# Patient Record
Sex: MALE | Race: WHITE | NOT HISPANIC OR LATINO | URBAN - METROPOLITAN AREA
[De-identification: names, ages, dates, MRNs, and addresses within clinical notes are randomized per-mention and may not be internally consistent; named-entity substitution may affect disease eponyms.]

---

## 2023-09-17 ENCOUNTER — HOSPITAL ENCOUNTER (INPATIENT)
Facility: HOSPITAL | Age: 53
LOS: 3 days | Discharge: HOME | DRG: 698 | End: 2023-09-20
Attending: EMERGENCY MEDICINE | Admitting: STUDENT IN AN ORGANIZED HEALTH CARE EDUCATION/TRAINING PROGRAM
Payer: OTHER GOVERNMENT

## 2023-09-17 DIAGNOSIS — R78.81 BACTEREMIA DUE TO STAPHYLOCOCCUS: ICD-10-CM

## 2023-09-17 DIAGNOSIS — B95.8 BACTEREMIA DUE TO STAPHYLOCOCCUS: ICD-10-CM

## 2023-09-17 DIAGNOSIS — R07.9 CHEST PAIN: ICD-10-CM

## 2023-09-17 DIAGNOSIS — A41.9 SEPSIS: ICD-10-CM

## 2023-09-17 DIAGNOSIS — R06.02 SOB (SHORTNESS OF BREATH): ICD-10-CM

## 2023-09-17 LAB
ALBUMIN SERPL BCP-MCNC: 2.9 G/DL (ref 3.5–5.2)
ALLENS TEST: ABNORMAL
ALP SERPL-CCNC: 122 U/L (ref 55–135)
ALT SERPL W/O P-5'-P-CCNC: 121 U/L (ref 10–44)
ANION GAP SERPL CALC-SCNC: 12 MMOL/L (ref 8–16)
AST SERPL-CCNC: 250 U/L (ref 10–40)
BACTERIA #/AREA URNS AUTO: ABNORMAL /HPF
BASOPHILS # BLD AUTO: 0.04 K/UL (ref 0–0.2)
BASOPHILS NFR BLD: 0.8 % (ref 0–1.9)
BILIRUB SERPL-MCNC: 1.6 MG/DL (ref 0.1–1)
BILIRUB UR QL STRIP: NEGATIVE
BUN SERPL-MCNC: 19 MG/DL (ref 6–20)
CALCIUM SERPL-MCNC: 7.7 MG/DL (ref 8.7–10.5)
CHLORIDE SERPL-SCNC: 113 MMOL/L (ref 95–110)
CLARITY UR REFRACT.AUTO: CLEAR
CO2 SERPL-SCNC: 13 MMOL/L (ref 23–29)
COLOR UR AUTO: YELLOW
CREAT SERPL-MCNC: 1.7 MG/DL (ref 0.5–1.4)
DIFFERENTIAL METHOD: ABNORMAL
EOSINOPHIL # BLD AUTO: 0 K/UL (ref 0–0.5)
EOSINOPHIL NFR BLD: 0.8 % (ref 0–8)
ERYTHROCYTE [DISTWIDTH] IN BLOOD BY AUTOMATED COUNT: 12.4 % (ref 11.5–14.5)
EST. GFR  (NO RACE VARIABLE): 47.6 ML/MIN/1.73 M^2
GLUCOSE SERPL-MCNC: 115 MG/DL (ref 70–110)
GLUCOSE UR QL STRIP: NEGATIVE
HCO3 UR-SCNC: 14.4 MMOL/L (ref 24–28)
HCT VFR BLD AUTO: 45.2 % (ref 40–54)
HGB BLD-MCNC: 15.5 G/DL (ref 14–18)
HGB UR QL STRIP: ABNORMAL
IMM GRANULOCYTES # BLD AUTO: 0.08 K/UL (ref 0–0.04)
IMM GRANULOCYTES NFR BLD AUTO: 1.6 % (ref 0–0.5)
KETONES UR QL STRIP: NEGATIVE
LDH SERPL L TO P-CCNC: 2.42 MMOL/L (ref 0.5–2.2)
LDH SERPL L TO P-CCNC: 3.68 MMOL/L (ref 0.5–2.2)
LEUKOCYTE ESTERASE UR QL STRIP: ABNORMAL
LIPASE SERPL-CCNC: 16 U/L (ref 4–60)
LYMPHOCYTES # BLD AUTO: 0.2 K/UL (ref 1–4.8)
LYMPHOCYTES NFR BLD: 4.1 % (ref 18–48)
MAGNESIUM SERPL-MCNC: 1.3 MG/DL (ref 1.6–2.6)
MCH RBC QN AUTO: 31.8 PG (ref 27–31)
MCHC RBC AUTO-ENTMCNC: 34.3 G/DL (ref 32–36)
MCV RBC AUTO: 93 FL (ref 82–98)
MICROSCOPIC COMMENT: ABNORMAL
MONOCYTES # BLD AUTO: 0 K/UL (ref 0.3–1)
MONOCYTES NFR BLD: 0.8 % (ref 4–15)
NEUTROPHILS # BLD AUTO: 4.7 K/UL (ref 1.8–7.7)
NEUTROPHILS NFR BLD: 91.9 % (ref 38–73)
NITRITE UR QL STRIP: NEGATIVE
NRBC BLD-RTO: 0 /100 WBC
PCO2 BLDA: 23.2 MMHG (ref 35–45)
PH SMN: 7.4 [PH] (ref 7.35–7.45)
PH UR STRIP: 6 [PH] (ref 5–8)
PLATELET # BLD AUTO: 130 K/UL (ref 150–450)
PLATELET BLD QL SMEAR: ABNORMAL
PMV BLD AUTO: 10.2 FL (ref 9.2–12.9)
PO2 BLDA: 60 MMHG (ref 40–60)
POC BE: -10 MMOL/L
POC SATURATED O2: 91 % (ref 95–100)
POC TCO2: 15 MMOL/L (ref 24–29)
POTASSIUM SERPL-SCNC: 3.2 MMOL/L (ref 3.5–5.1)
PROT SERPL-MCNC: 5.1 G/DL (ref 6–8.4)
PROT UR QL STRIP: NEGATIVE
RBC # BLD AUTO: 4.88 M/UL (ref 4.6–6.2)
RBC #/AREA URNS AUTO: 79 /HPF (ref 0–4)
SAMPLE: ABNORMAL
SARS-COV-2 RDRP RESP QL NAA+PROBE: NEGATIVE
SITE: ABNORMAL
SODIUM SERPL-SCNC: 138 MMOL/L (ref 136–145)
SP GR UR STRIP: 1.01 (ref 1–1.03)
SQUAMOUS #/AREA URNS AUTO: 2 /HPF
TROPONIN I SERPL DL<=0.01 NG/ML-MCNC: 0.02 NG/ML (ref 0–0.03)
URN SPEC COLLECT METH UR: ABNORMAL
WBC # BLD AUTO: 5.13 K/UL (ref 3.9–12.7)
WBC #/AREA URNS AUTO: 17 /HPF (ref 0–5)

## 2023-09-17 PROCEDURE — 96361 HYDRATE IV INFUSION ADD-ON: CPT

## 2023-09-17 PROCEDURE — 93010 EKG 12-LEAD: ICD-10-PCS | Mod: ,,, | Performed by: INTERNAL MEDICINE

## 2023-09-17 PROCEDURE — 84484 ASSAY OF TROPONIN QUANT: CPT

## 2023-09-17 PROCEDURE — 96365 THER/PROPH/DIAG IV INF INIT: CPT

## 2023-09-17 PROCEDURE — 87086 URINE CULTURE/COLONY COUNT: CPT

## 2023-09-17 PROCEDURE — 96366 THER/PROPH/DIAG IV INF ADDON: CPT

## 2023-09-17 PROCEDURE — 87077 CULTURE AEROBIC IDENTIFY: CPT

## 2023-09-17 PROCEDURE — 93010 ELECTROCARDIOGRAM REPORT: CPT | Mod: ,,, | Performed by: INTERNAL MEDICINE

## 2023-09-17 PROCEDURE — 85025 COMPLETE CBC W/AUTO DIFF WBC: CPT

## 2023-09-17 PROCEDURE — 81001 URINALYSIS AUTO W/SCOPE: CPT

## 2023-09-17 PROCEDURE — 87088 URINE BACTERIA CULTURE: CPT

## 2023-09-17 PROCEDURE — 99291 CRITICAL CARE FIRST HOUR: CPT

## 2023-09-17 PROCEDURE — 87154 CUL TYP ID BLD PTHGN 6+ TRGT: CPT

## 2023-09-17 PROCEDURE — 83735 ASSAY OF MAGNESIUM: CPT

## 2023-09-17 PROCEDURE — 83605 ASSAY OF LACTIC ACID: CPT

## 2023-09-17 PROCEDURE — 12000002 HC ACUTE/MED SURGE SEMI-PRIVATE ROOM

## 2023-09-17 PROCEDURE — U0002 COVID-19 LAB TEST NON-CDC: HCPCS

## 2023-09-17 PROCEDURE — 83690 ASSAY OF LIPASE: CPT

## 2023-09-17 PROCEDURE — 87040 BLOOD CULTURE FOR BACTERIA: CPT | Mod: 59

## 2023-09-17 PROCEDURE — 87186 SC STD MICRODIL/AGAR DIL: CPT

## 2023-09-17 PROCEDURE — 96367 TX/PROPH/DG ADDL SEQ IV INF: CPT

## 2023-09-17 PROCEDURE — 96368 THER/DIAG CONCURRENT INF: CPT

## 2023-09-17 PROCEDURE — 99900035 HC TECH TIME PER 15 MIN (STAT)

## 2023-09-17 PROCEDURE — 63600175 PHARM REV CODE 636 W HCPCS

## 2023-09-17 PROCEDURE — 93005 ELECTROCARDIOGRAM TRACING: CPT

## 2023-09-17 PROCEDURE — 99285 EMERGENCY DEPT VISIT HI MDM: CPT | Mod: 25

## 2023-09-17 PROCEDURE — 80053 COMPREHEN METABOLIC PANEL: CPT

## 2023-09-17 PROCEDURE — 25000003 PHARM REV CODE 250

## 2023-09-17 PROCEDURE — 82803 BLOOD GASES ANY COMBINATION: CPT

## 2023-09-17 PROCEDURE — 25500020 PHARM REV CODE 255: Performed by: EMERGENCY MEDICINE

## 2023-09-17 RX ORDER — IPRATROPIUM BROMIDE AND ALBUTEROL SULFATE 2.5; .5 MG/3ML; MG/3ML
SOLUTION RESPIRATORY (INHALATION)
COMMUNITY
Start: 2023-07-05

## 2023-09-17 RX ORDER — ALBUTEROL SULFATE 90 UG/1
AEROSOL, METERED RESPIRATORY (INHALATION)
COMMUNITY
Start: 2023-07-05

## 2023-09-17 RX ORDER — MAGNESIUM SULFATE HEPTAHYDRATE 40 MG/ML
2 INJECTION, SOLUTION INTRAVENOUS
Status: COMPLETED | OUTPATIENT
Start: 2023-09-17 | End: 2023-09-18

## 2023-09-17 RX ORDER — POTASSIUM CHLORIDE 20 MEQ/1
40 TABLET, EXTENDED RELEASE ORAL ONCE
Status: COMPLETED | OUTPATIENT
Start: 2023-09-17 | End: 2023-09-17

## 2023-09-17 RX ORDER — FLUTICASONE PROPIONATE AND SALMETEROL 500; 50 UG/1; UG/1
1 POWDER RESPIRATORY (INHALATION) 2 TIMES DAILY
COMMUNITY
Start: 2023-03-16 | End: 2023-09-17 | Stop reason: CLARIF

## 2023-09-17 RX ORDER — FLUTICASONE PROPIONATE AND SALMETEROL 500; 50 UG/1; UG/1
1 POWDER RESPIRATORY (INHALATION) 2 TIMES DAILY
COMMUNITY

## 2023-09-17 RX ORDER — ASPIRIN 81 MG/1
81 TABLET ORAL DAILY
COMMUNITY

## 2023-09-17 RX ORDER — CHOLECALCIFEROL (VITAMIN D3) 25 MCG
1000 TABLET ORAL DAILY
COMMUNITY

## 2023-09-17 RX ADMIN — VANCOMYCIN HYDROCHLORIDE 1750 MG: 500 INJECTION, POWDER, LYOPHILIZED, FOR SOLUTION INTRAVENOUS at 11:09

## 2023-09-17 RX ADMIN — POTASSIUM CHLORIDE 40 MEQ: 1500 TABLET, EXTENDED RELEASE ORAL at 10:09

## 2023-09-17 RX ADMIN — PIPERACILLIN SODIUM AND TAZOBACTAM SODIUM 4.5 G: 4; .5 INJECTION, POWDER, FOR SOLUTION INTRAVENOUS at 09:09

## 2023-09-17 RX ADMIN — MAGNESIUM SULFATE HEPTAHYDRATE 2 G: 40 INJECTION, SOLUTION INTRAVENOUS at 10:09

## 2023-09-17 RX ADMIN — SODIUM CHLORIDE, POTASSIUM CHLORIDE, SODIUM LACTATE AND CALCIUM CHLORIDE 2000 ML: 600; 310; 30; 20 INJECTION, SOLUTION INTRAVENOUS at 09:09

## 2023-09-17 RX ADMIN — IOHEXOL 75 ML: 350 INJECTION, SOLUTION INTRAVENOUS at 09:09

## 2023-09-18 PROBLEM — R65.20 SEVERE SEPSIS: Status: ACTIVE | Noted: 2023-09-18

## 2023-09-18 PROBLEM — A41.9 SEVERE SEPSIS: Status: ACTIVE | Noted: 2023-09-18

## 2023-09-18 PROBLEM — J45.20 MILD INTERMITTENT ASTHMA: Status: ACTIVE | Noted: 2023-09-18

## 2023-09-18 PROBLEM — K21.00 GASTROESOPHAGEAL REFLUX DISEASE WITH ESOPHAGITIS WITHOUT HEMORRHAGE: Status: ACTIVE | Noted: 2023-09-18

## 2023-09-18 PROBLEM — J96.01 ACUTE HYPOXEMIC RESPIRATORY FAILURE: Status: ACTIVE | Noted: 2023-09-18

## 2023-09-18 PROBLEM — E83.42 HYPOMAGNESEMIA: Status: ACTIVE | Noted: 2023-09-18

## 2023-09-18 PROBLEM — Z87.448 H/O URETHRAL STRICTURE: Status: ACTIVE | Noted: 2023-09-18

## 2023-09-18 PROBLEM — E87.6 HYPOKALEMIA: Status: ACTIVE | Noted: 2023-09-18

## 2023-09-18 PROBLEM — N17.9 AKI (ACUTE KIDNEY INJURY): Status: ACTIVE | Noted: 2023-09-18

## 2023-09-18 LAB
ALBUMIN SERPL BCP-MCNC: 2.7 G/DL (ref 3.5–5.2)
ALP SERPL-CCNC: 85 U/L (ref 55–135)
ALT SERPL W/O P-5'-P-CCNC: 204 U/L (ref 10–44)
ANION GAP SERPL CALC-SCNC: 8 MMOL/L (ref 8–16)
AST SERPL-CCNC: 274 U/L (ref 10–40)
BASOPHILS # BLD AUTO: 0.07 K/UL (ref 0–0.2)
BASOPHILS NFR BLD: 0.5 % (ref 0–1.9)
BILIRUB SERPL-MCNC: 1.6 MG/DL (ref 0.1–1)
BUN SERPL-MCNC: 16 MG/DL (ref 6–20)
CALCIUM SERPL-MCNC: 7.5 MG/DL (ref 8.7–10.5)
CHLORIDE SERPL-SCNC: 111 MMOL/L (ref 95–110)
CO2 SERPL-SCNC: 16 MMOL/L (ref 23–29)
CREAT SERPL-MCNC: 1.7 MG/DL (ref 0.5–1.4)
DIFFERENTIAL METHOD: ABNORMAL
EOSINOPHIL # BLD AUTO: 0 K/UL (ref 0–0.5)
EOSINOPHIL NFR BLD: 0.1 % (ref 0–8)
ERYTHROCYTE [DISTWIDTH] IN BLOOD BY AUTOMATED COUNT: 12.7 % (ref 11.5–14.5)
EST. GFR  (NO RACE VARIABLE): 47.6 ML/MIN/1.73 M^2
GLUCOSE SERPL-MCNC: 166 MG/DL (ref 70–110)
HAV IGM SERPL QL IA: NORMAL
HBV CORE IGM SERPL QL IA: NORMAL
HBV SURFACE AG SERPL QL IA: NORMAL
HCT VFR BLD AUTO: 40.3 % (ref 40–54)
HCV AB SERPL QL IA: NORMAL
HCV AB SERPL QL IA: NORMAL
HGB BLD-MCNC: 13.6 G/DL (ref 14–18)
HIV 1+2 AB+HIV1 P24 AG SERPL QL IA: NORMAL
IMM GRANULOCYTES # BLD AUTO: 0.17 K/UL (ref 0–0.04)
IMM GRANULOCYTES NFR BLD AUTO: 1.2 % (ref 0–0.5)
INR PPP: 1.1 (ref 0.8–1.2)
LACTATE SERPL-SCNC: 1.7 MMOL/L (ref 0.5–2.2)
LYMPHOCYTES # BLD AUTO: 0.7 K/UL (ref 1–4.8)
LYMPHOCYTES NFR BLD: 4.9 % (ref 18–48)
MAGNESIUM SERPL-MCNC: 4.1 MG/DL (ref 1.6–2.6)
MCH RBC QN AUTO: 31.9 PG (ref 27–31)
MCHC RBC AUTO-ENTMCNC: 33.7 G/DL (ref 32–36)
MCV RBC AUTO: 94 FL (ref 82–98)
MONOCYTES # BLD AUTO: 0.8 K/UL (ref 0.3–1)
MONOCYTES NFR BLD: 5.7 % (ref 4–15)
NEUTROPHILS # BLD AUTO: 11.9 K/UL (ref 1.8–7.7)
NEUTROPHILS NFR BLD: 87.6 % (ref 38–73)
NRBC BLD-RTO: 0 /100 WBC
PHOSPHATE SERPL-MCNC: 2.2 MG/DL (ref 2.7–4.5)
PLATELET # BLD AUTO: 112 K/UL (ref 150–450)
PLATELET BLD QL SMEAR: ABNORMAL
PMV BLD AUTO: 10.8 FL (ref 9.2–12.9)
POTASSIUM SERPL-SCNC: 4.1 MMOL/L (ref 3.5–5.1)
PROCALCITONIN SERPL IA-MCNC: 15.95 NG/ML
PROT SERPL-MCNC: 4.8 G/DL (ref 6–8.4)
PROTHROMBIN TIME: 11.5 SEC (ref 9–12.5)
RBC # BLD AUTO: 4.27 M/UL (ref 4.6–6.2)
SODIUM SERPL-SCNC: 135 MMOL/L (ref 136–145)
WBC # BLD AUTO: 13.62 K/UL (ref 3.9–12.7)

## 2023-09-18 PROCEDURE — 96366 THER/PROPH/DIAG IV INF ADDON: CPT

## 2023-09-18 PROCEDURE — 80053 COMPREHEN METABOLIC PANEL: CPT | Performed by: HOSPITALIST

## 2023-09-18 PROCEDURE — 85025 COMPLETE CBC W/AUTO DIFF WBC: CPT | Performed by: HOSPITALIST

## 2023-09-18 PROCEDURE — 85610 PROTHROMBIN TIME: CPT | Performed by: HOSPITALIST

## 2023-09-18 PROCEDURE — 99223 PR INITIAL HOSPITAL CARE,LEVL III: ICD-10-PCS | Mod: ,,, | Performed by: HOSPITALIST

## 2023-09-18 PROCEDURE — 96367 TX/PROPH/DG ADDL SEQ IV INF: CPT

## 2023-09-18 PROCEDURE — 99223 1ST HOSP IP/OBS HIGH 75: CPT | Mod: ,,, | Performed by: HOSPITALIST

## 2023-09-18 PROCEDURE — 80074 ACUTE HEPATITIS PANEL: CPT | Performed by: HOSPITALIST

## 2023-09-18 PROCEDURE — 25000003 PHARM REV CODE 250: Performed by: HOSPITALIST

## 2023-09-18 PROCEDURE — 21400001 HC TELEMETRY ROOM

## 2023-09-18 PROCEDURE — 83605 ASSAY OF LACTIC ACID: CPT | Performed by: HOSPITALIST

## 2023-09-18 PROCEDURE — 63600175 PHARM REV CODE 636 W HCPCS: Performed by: HOSPITALIST

## 2023-09-18 PROCEDURE — 84145 PROCALCITONIN (PCT): CPT | Performed by: HOSPITALIST

## 2023-09-18 PROCEDURE — 83735 ASSAY OF MAGNESIUM: CPT | Performed by: HOSPITALIST

## 2023-09-18 PROCEDURE — 87389 HIV-1 AG W/HIV-1&-2 AB AG IA: CPT | Performed by: HOSPITALIST

## 2023-09-18 PROCEDURE — 84100 ASSAY OF PHOSPHORUS: CPT | Performed by: HOSPITALIST

## 2023-09-18 PROCEDURE — 63600175 PHARM REV CODE 636 W HCPCS

## 2023-09-18 RX ORDER — IBUPROFEN 200 MG
16 TABLET ORAL
Status: DISCONTINUED | OUTPATIENT
Start: 2023-09-18 | End: 2023-09-20 | Stop reason: HOSPADM

## 2023-09-18 RX ORDER — IPRATROPIUM BROMIDE AND ALBUTEROL SULFATE 2.5; .5 MG/3ML; MG/3ML
3 SOLUTION RESPIRATORY (INHALATION) EVERY 4 HOURS PRN
Status: DISCONTINUED | OUTPATIENT
Start: 2023-09-18 | End: 2023-09-20 | Stop reason: HOSPADM

## 2023-09-18 RX ORDER — MAG HYDROX/ALUMINUM HYD/SIMETH 200-200-20
30 SUSPENSION, ORAL (FINAL DOSE FORM) ORAL 4 TIMES DAILY PRN
Status: DISCONTINUED | OUTPATIENT
Start: 2023-09-18 | End: 2023-09-20 | Stop reason: HOSPADM

## 2023-09-18 RX ORDER — ENOXAPARIN SODIUM 100 MG/ML
40 INJECTION SUBCUTANEOUS EVERY 24 HOURS
Status: DISCONTINUED | OUTPATIENT
Start: 2023-09-18 | End: 2023-09-20 | Stop reason: HOSPADM

## 2023-09-18 RX ORDER — ASPIRIN 81 MG/1
81 TABLET ORAL DAILY
Status: DISCONTINUED | OUTPATIENT
Start: 2023-09-18 | End: 2023-09-20 | Stop reason: HOSPADM

## 2023-09-18 RX ORDER — IBUPROFEN 200 MG
24 TABLET ORAL
Status: DISCONTINUED | OUTPATIENT
Start: 2023-09-18 | End: 2023-09-20 | Stop reason: HOSPADM

## 2023-09-18 RX ORDER — SODIUM CHLORIDE 0.9 % (FLUSH) 0.9 %
10 SYRINGE (ML) INJECTION EVERY 12 HOURS PRN
Status: DISCONTINUED | OUTPATIENT
Start: 2023-09-18 | End: 2023-09-20 | Stop reason: HOSPADM

## 2023-09-18 RX ORDER — NALOXONE HCL 0.4 MG/ML
0.02 VIAL (ML) INJECTION
Status: DISCONTINUED | OUTPATIENT
Start: 2023-09-18 | End: 2023-09-20 | Stop reason: HOSPADM

## 2023-09-18 RX ORDER — CHOLECALCIFEROL (VITAMIN D3) 25 MCG
1000 TABLET ORAL DAILY
Status: DISCONTINUED | OUTPATIENT
Start: 2023-09-18 | End: 2023-09-20 | Stop reason: HOSPADM

## 2023-09-18 RX ORDER — TALC
6 POWDER (GRAM) TOPICAL NIGHTLY PRN
Status: DISCONTINUED | OUTPATIENT
Start: 2023-09-18 | End: 2023-09-19

## 2023-09-18 RX ORDER — ONDANSETRON 2 MG/ML
4 INJECTION INTRAMUSCULAR; INTRAVENOUS EVERY 8 HOURS PRN
Status: DISCONTINUED | OUTPATIENT
Start: 2023-09-18 | End: 2023-09-20 | Stop reason: HOSPADM

## 2023-09-18 RX ORDER — FLUTICASONE FUROATE AND VILANTEROL 200; 25 UG/1; UG/1
1 POWDER RESPIRATORY (INHALATION) DAILY
Status: DISCONTINUED | OUTPATIENT
Start: 2023-09-18 | End: 2023-09-20 | Stop reason: HOSPADM

## 2023-09-18 RX ORDER — ACETAMINOPHEN 325 MG/1
650 TABLET ORAL EVERY 4 HOURS PRN
Status: DISCONTINUED | OUTPATIENT
Start: 2023-09-18 | End: 2023-09-20 | Stop reason: HOSPADM

## 2023-09-18 RX ORDER — GLUCAGON 1 MG
1 KIT INJECTION
Status: DISCONTINUED | OUTPATIENT
Start: 2023-09-18 | End: 2023-09-20 | Stop reason: HOSPADM

## 2023-09-18 RX ADMIN — PIPERACILLIN SODIUM AND TAZOBACTAM SODIUM 4.5 G: 4; .5 INJECTION, POWDER, FOR SOLUTION INTRAVENOUS at 04:09

## 2023-09-18 RX ADMIN — PIPERACILLIN SODIUM AND TAZOBACTAM SODIUM 4.5 G: 4; .5 INJECTION, POWDER, FOR SOLUTION INTRAVENOUS at 12:09

## 2023-09-18 RX ADMIN — PIPERACILLIN SODIUM AND TAZOBACTAM SODIUM 4.5 G: 4; .5 INJECTION, POWDER, FOR SOLUTION INTRAVENOUS at 10:09

## 2023-09-18 RX ADMIN — ASPIRIN 81 MG: 81 TABLET, COATED ORAL at 08:09

## 2023-09-18 RX ADMIN — CHOLECALCIFEROL TAB 25 MCG (1000 UNIT) 1000 UNITS: 25 TAB at 08:09

## 2023-09-18 RX ADMIN — MAGNESIUM SULFATE HEPTAHYDRATE 2 G: 40 INJECTION, SOLUTION INTRAVENOUS at 12:09

## 2023-09-18 NOTE — HPI
Paul Nagy is a 52 year old male with PMH of asthma, urethral stricture with intermittent self cath who presented to ED for evaluation of SOB and chills. Patient states he has been having dysuria since self catheterization on Saturday and he usually need to self cath once every other week. He reports felling SOB earlier today associated with subjective fever, chills and myalgia while working on his computer which prompted him to call EMS. He denies headache, cough, wheezing, chest pain, palpitation, N/V/D/abdominal pain, hematuria, urgency, focal weakness/numbness, dark stool or bleeding from other sites. [facility].     ED course: febrile 101.3, tachycardia HR 130s and borderline hypotensive BP 90/70. Blood and urine cultures sent. Patient received IVF 30 ml/kg and empiric dose of vancomycin and zosyn per sepsis protocol. WBC 5, Cr 1.7, K 3.2, Mg 1.3, bilirubin 1.6, , . Elevated lactic acid 3.6 which improved to 2.4 on repeat. Troponin negative, COVID negative. CTA chest negative for PE but showed small airway disease, esophageal wall thickening of GERD vs reflux esophagitis . UA 3+ occult blood, 80 RBC, 2+ leukocytes and 17 WBC.  Patient received oral K and IV Mag SO4 replacement. CT abdomen w/o showed no acute process.     During my interview, patient is awake, conversant. He is not in distress and reports feeling better with sat 96% on 2 liter nasal canula.

## 2023-09-18 NOTE — ASSESSMENT & PLAN NOTE
Patient has Abnormal Magnesium: hypomagnesemia. Will continue to monitor electrolytes closely. Will replace the affected electrolytes and repeat labs to be done after interventions completed. The patient's magnesium results have been reviewed and are listed below.  Recent Labs   Lab 09/17/23 2052   MG 1.3*

## 2023-09-18 NOTE — ASSESSMENT & PLAN NOTE
"This patient does have evidence of infective focus  My overall impression is severe sepsis .  Source: Respiratory and Urinary Tract with developing Pneumonia and UTI   Antibiotics given-   Antibiotics (72h ago, onward)    Start     Stop Route Frequency Ordered    09/18/23 0500  piperacillin-tazobactam (ZOSYN) 4.5 g in dextrose 5 % in water (D5W) 100 mL IVPB (MB+)         -- IV Every 8 hours (non-standard times) 09/18/23 0336        Latest lactate reviewed-  No results for input(s): "LACTATE" in the last 72 hours.  Organ dysfunction indicated by Acute kidney injury    Fluid challenge Actual Body weight- Patient will receive 30ml/kg actual body weight to calculate fluid bolus for treatment of septic shock.     Post- resuscitation assessment Yes Perfusion exam was performed within 6 hours of septic shock presentation after bolus shows Adequate tissue perfusion assessed by non-invasive monitoring       Will Not start Pressors-   Source control achieved by:  -continue zosyn for empiric pulmonary and  coverage   -lactic acid from improved 3.6 to 2.4 after IVF  -CT abdomen w/o acute process   -follow up with blood and urine cultures   "

## 2023-09-18 NOTE — ED PROVIDER NOTES
Encounter Date: 9/17/2023       History     Chief Complaint   Patient presents with    Shortness of Breath     Sudden onset SOB upon waking up just prior to arrival, sating 90% on RA, arrives on 10 L via NRB     Patient is a 52-year-old male with past medical history of asthma that presents to the emergency department with shortness of breath.  Patient states that he had progressive shortness of breath that began last evening prior to going to bed.  Even though he does have asthma this did not feel like an asthma exacerbation, denies feeling tightness or wheezing in his chest.  Patient woke up initially feeling improved however states that he decompensated through the day.  He no longer could tolerate feeling the way he is feeling and he called for an ambulance.  EMS states that he was hypotensive, as well as hypoxic to 90s in route.  On presentation patient does not admit any chest pain or cough or abdominal pain.  Patient does admit history of urethral stricture for which he dilates the urethra on a scheduled basis.  Most recently about a week ago patient self dilated, he was felt significant pain with urination since.  Denies any current sick contacts, denies lower extremity edema, denies nausea or vomiting.    Asked and denies any other symptoms at this time.    The history is provided by the patient and medical records.     Review of patient's allergies indicates:  No Known Allergies  Past Medical History:   Diagnosis Date    Asthma     COPD (chronic obstructive pulmonary disease)     HLD (hyperlipidemia)     Hypertension     Obesity, unspecified     Urethral stricture      Past Surgical History:   Procedure Laterality Date    uretheral stritcure       History reviewed. No pertinent family history.  Social History     Tobacco Use    Smoking status: Some Days     Types: Cigarettes    Smokeless tobacco: Never   Substance Use Topics    Alcohol use: Yes     Comment: 2-3x weekly    Drug use: Never     Review of  Systems  ROS negative except as noted in HPI    Physical Exam     Initial Vitals [09/17/23 2011]   BP Pulse Resp Temp SpO2   90/70 (!) 130 (!) 26 100.1 °F (37.8 °C) 95 %      MAP       --         Physical Exam    Nursing note and vitals reviewed.  Constitutional: He has a sickly appearance. He appears ill. He appears distressed.   HENT:   Head: Normocephalic and atraumatic.   Right Ear: External ear normal.   Left Ear: External ear normal.   Mouth/Throat: Mucous membranes are dry. No oral lesions. No dental abscesses. No posterior oropharyngeal edema or posterior oropharyngeal erythema.   Eyes: Conjunctivae and EOM are normal. Pupils are equal, round, and reactive to light.   Neck: Neck supple.   Normal range of motion.  Cardiovascular:  Regular rhythm, S1 normal, S2 normal, normal heart sounds, intact distal pulses and normal pulses.   Tachycardia present.         No murmur heard.  Pulmonary/Chest: Breath sounds normal. He is in respiratory distress. He has no wheezes.   Abdominal: Abdomen is soft. He exhibits no distension. There is no abdominal tenderness. There is no rebound.   Musculoskeletal:         General: Normal range of motion.      Cervical back: Normal range of motion and neck supple.     Neurological: He is alert and oriented to person, place, and time. He has normal strength.   Skin: Skin is warm and dry. Capillary refill takes less than 2 seconds.   Psychiatric: He has a normal mood and affect.         ED Course   Procedures  Labs Reviewed   CBC W/ AUTO DIFFERENTIAL - Abnormal; Notable for the following components:       Result Value    MCH 31.8 (*)     Platelets 130 (*)     Immature Granulocytes 1.6 (*)     Immature Grans (Abs) 0.08 (*)     Lymph # 0.2 (*)     Mono # 0.0 (*)     Gran % 91.9 (*)     Lymph % 4.1 (*)     Mono % 0.8 (*)     Platelet Estimate Decreased (*)     All other components within normal limits   COMPREHENSIVE METABOLIC PANEL - Abnormal; Notable for the following components:     Potassium 3.2 (*)     Chloride 113 (*)     CO2 13 (*)     Glucose 115 (*)     Creatinine 1.7 (*)     Calcium 7.7 (*)     Total Protein 5.1 (*)     Albumin 2.9 (*)     Total Bilirubin 1.6 (*)      (*)      (*)     eGFR 47.6 (*)     All other components within normal limits   URINALYSIS, REFLEX TO URINE CULTURE - Abnormal; Notable for the following components:    Occult Blood UA 3+ (*)     Leukocytes, UA 2+ (*)     All other components within normal limits    Narrative:     Specimen Source->Urine   MAGNESIUM - Abnormal; Notable for the following components:    Magnesium 1.3 (*)     All other components within normal limits   URINALYSIS MICROSCOPIC - Abnormal; Notable for the following components:    RBC, UA 79 (*)     WBC, UA 17 (*)     All other components within normal limits    Narrative:     Specimen Source->Urine   COMPREHENSIVE METABOLIC PANEL - Abnormal; Notable for the following components:    Sodium 135 (*)     Chloride 111 (*)     CO2 16 (*)     Glucose 166 (*)     Creatinine 1.7 (*)     Calcium 7.5 (*)     Total Protein 4.8 (*)     Albumin 2.7 (*)     Total Bilirubin 1.6 (*)      (*)      (*)     eGFR 47.6 (*)     All other components within normal limits    Narrative:     Release to patient->Immediate   MAGNESIUM - Abnormal; Notable for the following components:    Magnesium 4.1 (*)     All other components within normal limits    Narrative:     Release to patient->Immediate   PHOSPHORUS - Abnormal; Notable for the following components:    Phosphorus 2.2 (*)     All other components within normal limits    Narrative:     Release to patient->Immediate   PROCALCITONIN - Abnormal; Notable for the following components:    Procalcitonin 15.95 (*)     All other components within normal limits    Narrative:     Release to patient->Immediate   CBC W/ AUTO DIFFERENTIAL - Abnormal; Notable for the following components:    WBC 13.62 (*)     RBC 4.27 (*)     Hemoglobin 13.6 (*)     MCH 31.9  (*)     Platelets 112 (*)     Immature Granulocytes 1.2 (*)     Gran # (ANC) 11.9 (*)     Immature Grans (Abs) 0.17 (*)     Lymph # 0.7 (*)     Gran % 87.6 (*)     Lymph % 4.9 (*)     Platelet Estimate Decreased (*)     All other components within normal limits    Narrative:     Release to patient->Immediate   ISTAT LACTATE - Abnormal; Notable for the following components:    POC Lactate 3.68 (*)     All other components within normal limits   ISTAT PROCEDURE - Abnormal; Notable for the following components:    POC PCO2 23.2 (*)     POC HCO3 14.4 (*)     POC SATURATED O2 91 (*)     POC TCO2 15 (*)     All other components within normal limits   ISTAT LACTATE - Abnormal; Notable for the following components:    POC Lactate 2.42 (*)     All other components within normal limits   CULTURE, BLOOD    Narrative:     Aerobic and anaerobic   CULTURE, BLOOD    Narrative:     Aerobic and anaerobic   CULTURE, URINE   TROPONIN I   LIPASE   SARS-COV-2 RNA AMPLIFICATION, QUAL   LACTIC ACID, PLASMA    Narrative:     Release to patient->Immediate   HEPATITIS PANEL, ACUTE    Narrative:     Release to patient->Immediate   HEPATITIS C ANTIBODY    Narrative:     Release to patient->Immediate   HIV 1 / 2 ANTIBODY    Narrative:     Release to patient->Immediate   PROTIME-INR    Narrative:     Release to patient->Immediate     EKG Readings: (Independently Interpreted)   Sinus tachycardia no ST elevation or depression, normal intervals     ECG Results              EKG 12-lead (Final result)  Result time 09/18/23 15:51:44      Final result by Interface, Lab In [facility] (09/18/23 15:51:44)                   Narrative:    Test Reason : A41.9,    Vent. Rate : 117 BPM     Atrial Rate : 117 BPM     P-R Int : 116 ms          QRS Dur : 074 ms      QT Int : 290 ms       P-R-T Axes : 037 082 051 degrees     QTc Int : 404 ms    Sinus tachycardia  Low voltage QRS  Borderline Abnormal ECG  No previous ECGs available  Confirmed by Vlad SCHAEFER, Dat UMANA  ([id number]) on 9/18/2023 3:51:29 PM    Referred By: KALPANA   SELF           Confirmed By:Dat Chu MD                                  Imaging Results              US Abdomen Limited with Doppler (xpd) (In process)                      CT Abdomen Pelvis  Without Contrast (Final result)  Result time 09/18/23 00:25:06      Final result by Harley Velázquez MD (09/18/23 00:25:06)                   Impression:      No acute noncontrast abnormality identified in the abdomen or pelvis.    Nonspecific bilateral periadrenal fat stranding, potentially related to adrenal congestion.    Small hiatal hernia and mild lower esophageal wall prominence as seen on recent CTA chest.    Other incidental findings discussed in the body of the report.      Electronically signed by: Harley Velázquez MD  Date:    09/18/2023  Time:    00:25               Narrative:    EXAMINATION:  CT ABDOMEN PELVIS WITHOUT CONTRAST    CLINICAL HISTORY:  Abdominal pain, acute, nonlocalized;    TECHNIQUE:  Low dose axial images, sagittal and coronal reformations were obtained from the lung bases to the pubic symphysis. Oral and IV contrast not administered.    COMPARISON:  CTA chest, 09/17/2023.    FINDINGS:  Lower chest: Heart size is normal. Mild bibasilar subsegmental atelectasis.  No consolidation.  No pleural effusion.  No pericardial effusion.  Mild lower esophageal wall prominence and small hiatal hernia.    Abdomen:    Evaluation of the solid abdominal organs and bowel is limited in the absence of IV contrast.    Liver is normal in size and contour without focal contour deforming lesion. Gallbladder is unremarkable. No calcified gallstones. No intra-or extrahepatic biliary ductal dilatation.    Spleen and pancreas are unremarkable.  Minimal nonspecific bilateral adrenal gland fat stranding.  No discrete adrenal nodule.    Kidneys are symmetric.  Contrast is present in the bilateral collecting system, limiting evaluation for renal stones.  No  sizable renal stones identified.  Small hypodensity in the upper pole of the left kidney, likely a simple or mildly complex cyst.  No hydronephrosis.  No asymmetric perinephric inflammatory fat stranding.    No small bowel obstruction.  Normal appendix.  No inflammatory changes identified in bowel.    No abdominal free fluid or pneumoperitoneum.    Abdominal aorta is normal in caliber with moderate calcific atherosclerosis.    No bulky retroperitoneal lymphadenopathy.    Pelvis: Urinary bladder is mildly distended with IV contrast.  Prostate is not significantly enlarged.  There are coarse calcifications in the prostate.  Rectum is unremarkable.  No pelvic free fluid.  No bulky pelvic lymphadenopathy.    Bones and soft tissues: No aggressive osseous lesions. Bilateral pars defects at L4 with minimal anterolisthesis of L4 with respect to L5.  Mild degenerative changes in both hips.  Extraperitoneal soft tissues are negative for acute finding.                                       CTA Chest Non-Coronary (PE Studies) (Final result)  Result time 09/17/23 22:41:19      Final result by Harley Velázquez MD (09/17/23 22:41:19)                   Impression:      Pulmonary arteries are patent without evidence of acute embolism.    Findings suggestive of small airways disease with retained secretions versus small volume aspiration in the distal bronchi.    Mild lower esophageal wall prominence and small hiatal hernia.  Suggest correlation for symptoms of gastroesophageal reflux or esophagitis.    Motion limited study.    Incidental findings discussed in the body of the report.    Electronically signed by resident: Adam De La Vega  Date:    09/17/2023  Time:    22:21    Electronically signed by: Harley Velázquez MD  Date:    09/17/2023  Time:    22:41               Narrative:    EXAMINATION:  CTA CHEST NON CORONARY (PE STUDIES)    CLINICAL HISTORY:  Pulmonary embolism (PE) suspected, unknown D-dimer;    TECHNIQUE:  Low dose  axial images, sagittal and coronal reformations were obtained from the thoracic inlet to the lung bases following the IV administration of 75 mL of Omnipaque 350.  Contrast timing was optimized to evaluate the pulmonary arteries.  MIP images were performed.    COMPARISON:  Chest x-ray 09/17/2023    FINDINGS:  SOFT TISSUES:  No axillary or subpectoral lymphadenopathy. The visualized thyroid gland is unremarkable.    HEART & MEDIASTINUM: Evaluation is limited by motion.  Pulmonary arteries are patent to the subsegmental level.  Heart is normal in size.  No pericardial effusion.  Coronary arterial calcific plaque.  No abnormal bowing of the interventricular septum to suggest right heart strain.    PLEURA:  No pleural effusion or pneumothorax.    LUNGS AND AIRWAYS: Evaluation is limited by motion. The lungs demonstrate mosaic attenuation.  This can be seen with elevated pulmonary vascular resistance, small airway disease, or both.   Mild diffuse peribronchial cuffing with trace retained secretions in the bilateral upper lobe airways.  Small micro nodules in the right upper lobe measuring up to 4 mm in size (series 3, images 82 and 94).  The small nodules are adjacent to small bronchi in the right upper lobe which demonstrate luminal narrowing and retained secretions (series 3, image 119).  Scattered linear subsegmental atelectasis versus scarring.  No consolidation.  No focal mass lesion.    UPPER ABDOMEN (limited):  No pneumoperitoneum. Incompletely characterized small hypodensity in the upper pole of the left kidney, potentially a mildly complex cyst or focal cortical hypoattenuation.  Mild lower esophageal wall prominence and small hiatal hernia.    BONES:  No fractures or focal osseous lesions.                                       X-Ray Chest AP Portable (Final result)  Result time 09/17/23 21:21:00      Final result by Harley Velázquez MD (09/17/23 21:21:00)                   Impression:      Minimal patchy  "increased ground-glass attenuation in the lung bases, potentially related to soft tissue attenuation of the x-ray beam, though pneumonitis or other low-grade infectious/inflammatory process could appear similarly in this patient with sepsis and oxygen saturation of 90% per chart review.    No large focal consolidation.      Electronically signed by: Harley Velázquez MD  Date:    09/17/2023  Time:    21:21               Narrative:    EXAMINATION:  XR CHEST AP PORTABLE    CLINICAL HISTORY:  Provided history is "Sepsis;  ".    TECHNIQUE:  One view of the chest.    COMPARISON:  None.    FINDINGS:  Cardiac wires overlie the chest.  Cardiomediastinal silhouette is not enlarged.  No confluent area of consolidation.  Minimal patchy increased ground-glass attenuation in the lung bases.  No large pleural effusion.  No pneumothorax.                                    X-Rays:   Independently Interpreted Readings:   Chest X-Ray: No acute cardiopulmonary etiology, does appear to have mild widening in the mediastinum in the right thoracic region     Medications   vitamin D 1000 units tablet 1,000 Units (1,000 Units Oral Given 9/18/23 0850)   fluticasone furoate-vilanteroL 200-25 mcg/dose diskus inhaler 1 puff (1 puff Inhalation Not Given 9/18/23 1009)   aspirin EC tablet 81 mg (81 mg Oral Given 9/18/23 0850)   sodium chloride 0.9% flush 10 mL (has no administration in time range)   naloxone 0.4 mg/mL injection 0.02 mg (has no administration in time range)   glucose chewable tablet 16 g (has no administration in time range)   glucose chewable tablet 24 g (has no administration in time range)   glucagon (human recombinant) injection 1 mg (has no administration in time range)   acetaminophen tablet 650 mg (has no administration in time range)   ondansetron injection 4 mg (has no administration in time range)   melatonin tablet 6 mg (has no administration in time range)   aluminum-magnesium hydroxide-simethicone 200-200-20 mg/5 mL " suspension 30 mL (has no administration in time range)   dextrose 10% bolus 125 mL 125 mL (has no administration in time range)   dextrose 10% bolus 250 mL 250 mL (has no administration in time range)   albuterol-ipratropium 2.5 mg-0.5 mg/3 mL nebulizer solution 3 mL (has no administration in time range)   piperacillin-tazobactam (ZOSYN) 4.5 g in dextrose 5 % in water (D5W) 100 mL IVPB (MB+) (0 g Intravenous Stopped 9/18/23 1620)   enoxaparin injection 40 mg (has no administration in time range)   lactated ringers bolus 2,721 mL (0 mLs Intravenous Stopped 9/17/23 2326)   piperacillin-tazobactam (ZOSYN) 4.5 g in dextrose 5 % in water (D5W) 100 mL IVPB (MB+) (0 g Intravenous Stopped 9/17/23 2147)   vancomycin 1.75 g in 5 % dextrose 500 mL IVPB (0 mg Intravenous Stopped 9/18/23 0136)   iohexoL (OMNIPAQUE 350) injection 75 mL (75 mLs Intravenous Given 9/17/23 2156)   potassium chloride SA CR tablet 40 mEq (40 mEq Oral Given 9/17/23 2238)   magnesium sulfate 2g in water 50mL IVPB (premix) (0 g Intravenous Stopped 9/18/23 0136)     Medical Decision Making  Patient is a 52-year-old male with past medical history of asthma that presents to the emergency department with hypotension and shortness of breath.  Additionally after presentation patient did have a febrile episode.    Initial evaluation patient was distressed, tachycardic, and febrile.  Initial vitals in clinical history concerning for sepsis.  Patient was initiated with sepsis workup and treatment.  He received 30 cc/kg as well as IV antibiotics.  After fluid resuscitation patient did have adequate response with improvement in his vitals.  He was re-evaluated and did not demonstrate any significant shortness of breath.  We did not appreciate any clinical signs or symptoms of an asthma exacerbation.  No breathing treatments or other respiratory treatment indicated at this time.    Workup significant for many laboratory abnormalities consistent with a hypoperfused  state.  He had elevated LFTs, MONE, and elevated lactic acid.  At this point suspect urinary as primary source given history of urethral strictures for requiring multiple dilations.    Given the significant amount of hypoxia and shortness of breath in setting of the slightly abnormal mediastinum on chest x-ray we obtain a CT chest to evaluate for pulmonary embolism and evaluate thoracic arteries.  CT chest was unremarkable.    We will admit patient to hospital medicine for further evaluation, treatment and workup for sepsis.    Patient was updated and had no further questions about the plan of care    Amount and/or Complexity of Data Reviewed  Labs: ordered. Decision-making details documented in ED Course.  Radiology: ordered and independent interpretation performed. Decision-making details documented in ED Course.  ECG/medicine tests: ordered and independent interpretation performed. Decision-making details documented in ED Course.    Risk  Prescription drug management.  Drug therapy requiring intensive monitoring for toxicity.  Decision regarding hospitalization.               ED Course as of 09/18/23 1629   Sun Sep 17, 2023   2111 POC Lactate(!!): 3.68 [DC]   2116 ISTAT Lactate(!!)  Elevated Lactic acid. 30 cc/kg already running [TK]   2134 CBC auto differential(!)  No leukocytosis - clinically still suspect infection  [TK]   2146 Comprehensive metabolic panel(!)  Significant abnormalities, consistent with hypoperfusion in a septic state [TK]   2243 ISTAT PROCEDURE(!)  Consistent with Metabolic acidosis.  [TK]      ED Course User Index  [DC] Gilberto Thibodeaux MD  [TK] Josue Santo, DO        This patient does have evidence of infective focus  My overall impression is sepsis.  Source: Urinary Tract  Antibiotics given-   Antibiotics (72h ago, onward)      Start     Stop Route Frequency Ordered    09/18/23 0500  piperacillin-tazobactam (ZOSYN) 4.5 g in dextrose 5 % in water (D5W) 100 mL IVPB (MB+)         -- IV  Every 8 hours (non-standard times) 09/18/23 0336          Latest lactate reviewed-  Recent Labs   Lab 09/17/23  2328 09/18/23  0416   LACTATE  --  1.7   POCLAC 2.42*  --      Organ dysfunction indicated by Acute respiratory failure    Fluid challenge Ideal Body Weight- The patient's ideal body weight is Ideal body weight: 73 kg (160 lb 15 oz) which will be used to calculate fluid bolus of 30 ml/kg for treatment of septic shock.      Post- resuscitation assessment Yes Perfusion exam was performed within 6 hours of septic shock presentation after bolus shows Adequate tissue perfusion assessed by non-invasive monitoring       Critical Care   Date: 09/18/2023  Performed by: Gilberto Thibodeaux MD   Authorized by: Gilberto Thibodeaux MD    Total critical care time (exclusive of procedural time) : 40 minutes  Critical care was necessary to treat or prevent imminent or life-threatening deterioration of the following conditions:  sepsis            Clinical Impression:   Final diagnoses:  [A41.9] Sepsis  [R06.02] SOB (shortness of breath)        ED Disposition Condition    Admit                 Josue Santo,   Resident  09/18/23 0131       Gilberto Thibodeaux MD  09/18/23 9558

## 2023-09-18 NOTE — ASSESSMENT & PLAN NOTE
Patient with acute kidney injury/acute renal failure likely due to acute tubular necrosis caused by severe sepsis and transient hypotension  MONE is currently stable. Baseline creatinine unknown - Labs reviewed- Renal function/electrolytes with Estimated Creatinine Clearance: 56.9 mL/min (A) (based on SCr of 1.7 mg/dL (H)). according to latest data. Monitor urine output and serial BMP and adjust therapy as needed. Avoid nephrotoxins and renally dose meds for GFR listed above.

## 2023-09-18 NOTE — PLAN OF CARE
[facility] - [facility] ([place] [address])  Initial Discharge Assessment       Primary Care Provider: Jacqui, Primary Doctor    Admission Diagnosis: SOB (shortness of breath) [R06.02]  Chest pain [R07.9]  Sepsis [A41.9]    Admission Date: 9/17/2023  Expected Discharge Date: 9/20/2023    Transition of Care Barriers: (P) None    Payor: [employer] / Plan: [facility] [employer] [employer] / Product Type: Government /     Extended Emergency Contact Information  Primary Emergency Contact: Paul Nagy Sr.  Mobile Phone: [phone]  Relation: Father  Preferred language: English   needed? No    Discharge Plan A: (P) Home  Discharge Plan B: (P) Home    No Pharmacies Listed    Initial Assessment (most recent)       Adult Discharge Assessment - 09/18/23 1603          Discharge Assessment    Assessment Type Discharge Planning Assessment (P)      Confirmed/corrected address, phone number and insurance Yes (P)      Confirmed Demographics Correct on Facesheet (P)      Source of Information patient (P)      Communicated ROSA with patient/caregiver No (P)      Reason For Admission sepsis (P)      People in Home alone (P)      Facility Arrived From: homw (P)      Do you expect to return to your current living situation? Yes (P)      Do you have help at home or someone to help you manage your care at home? No (P)      Prior to hospitilization cognitive status: Unable to Assess (P)      Current cognitive status: Alert/Oriented (P)      Home Layout Able to live on 1st floor (P)      Equipment Currently Used at Home none (P)      Readmission within 30 days? No (P)      Do you currently have service(s) that help you manage your care at home? No (P)      Do you take prescription medications? Yes (P)      Do you have prescription coverage? Yes (P)      Coverage [facility] (P)      Do you have any problems affording any of your prescribed medications? No (P)      Who is going to help you get home at discharge? Dad (P)      How do you get to  doctors appointments? car, drives self (P)      Are you on dialysis? No (P)      Do you take coumadin? No (P)      DME Needed Upon Discharge  none (P)      Discharge Plan discussed with: Patient (P)      Transition of Care Barriers None (P)      Discharge Plan A Home (P)      Discharge Plan B Home (P)         Physical Activity    On average, how many days per week do you engage in moderate to strenuous exercise (like a brisk walk)? 3 days (P)      On average, how many minutes do you engage in exercise at this level? 30 min (P)         Financial Resource Strain    How hard is it for you to pay for the very basics like food, housing, medical care, and heating? Not very hard (P)         Stress    Do you feel stress - tense, restless, nervous, or anxious, or unable to sleep at night because your mind is troubled all the time - these days? To some extent (P)         Social Connections    In a typical week, how many times do you talk on the phone with family, friends, or neighbors? More than three times a week (P)      How often do you get together with friends or relatives? More than three times a week (P)      How often do you attend Moravian or Zoroastrianism services? Never (P)      Do you belong to any clubs or organizations such as Moravian groups, unions, fraternal or athletic groups, or school groups? No (P)      How often do you attend meetings of the clubs or organizations you belong to? Never (P)      Are you , , , , never , or living with a partner?  (P)         Alcohol Use    Q1: How often do you have a drink containing alcohol? 2-3 times a week (P)      Q2: How many drinks containing alcohol do you have on a typical day when you are drinking? 1 or 2 (P)      Q3: How often do you have six or more drinks on one occasion? Never (P)                  CM spoke with pt in room.  He lives alone on first floor of [address] [place] [address].  His dad will drive him home, and he drives  self to MD appts.  No 30D readmission.  No HH, DME, coumadin, HD.  Indep with ADL's.  Has [facility] insurance and gets meds [facility].    POLLY HoangN, BS, RN, CCM

## 2023-09-18 NOTE — ASSESSMENT & PLAN NOTE
Patient with Hypoxic Respiratory failure which is Acute.  he is not on home oxygen. Supplemental oxygen was provided and noted-      .   Signs/symptoms of respiratory failure include- tachypnea and increased work of breathing. Contributing diagnoses includes - Pneumonia Labs and images were reviewed. Patient Has not had a recent ABG. Will treat underlying causes and adjust management of respiratory failure as follows-   -continue empiric zosyn and follow up with blood cultures   -continue supplemental oxygen with goal sat ~94%

## 2023-09-18 NOTE — ED TRIAGE NOTES
"Paul Nagy, a 52 y.o. male presents to the ED w/ complaint of sitting at computer at home when both arms started hurting. Laid down and woke up with SOB and "feeling freezing with all the covers on me" Hx of asthma    Triage note:  Chief Complaint   Patient presents with    Shortness of Breath     Sudden onset SOB upon waking up just prior to arrival, sating 90% on RA, arrives on 10 L via NRB     Review of patient's allergies indicates:  No Known Allergies  No past medical history on file.    "

## 2023-09-18 NOTE — SUBJECTIVE & OBJECTIVE
Past Medical History:   Diagnosis Date    Asthma     COPD (chronic obstructive pulmonary disease)     HLD (hyperlipidemia)     Hypertension     Obesity, unspecified     Urethral stricture        Past Surgical History:   Procedure Laterality Date    uretheral stritcure         Review of patient's allergies indicates:  No Known Allergies    No current facility-administered medications on file prior to encounter.     Current Outpatient Medications on File Prior to Encounter   Medication Sig    albuterol (PROVENTIL/VENTOLIN HFA) 90 mcg/actuation inhaler INHALE 2 PUFFS BY MOUTH FOUR TIMES A DAY AS NEEDED FOR BREATHING    albuterol-ipratropium (DUO-NEB) 2.5 mg-0.5 mg/3 mL nebulizer solution INHALE 3ML BY NEBULIZER FOUR TIMES A DAY AS NEEDED TO OPEN AIRWAYS    fluticasone-salmeterol diskus inhaler 500-50 mcg Inhale 1 puff into the lungs 2 (two) times daily. Controller    aspirin (ECOTRIN) 81 MG EC tablet Take 81 mg by mouth once daily.    vitamin D (VITAMIN D3) 1000 units Tab Take 1,000 Units by mouth once daily.     Family History    None       Tobacco Use    Smoking status: Some Days     Types: Cigarettes    Smokeless tobacco: Never   Substance and Sexual Activity    Alcohol use: Yes     Comment: 2-3x weekly    Drug use: Never    Sexual activity: Not Currently     Review of Systems   Constitutional:  Positive for chills, fatigue and fever. Negative for activity change, appetite change, diaphoresis and unexpected weight change.   HENT:  Negative for congestion, dental problem, drooling, ear discharge, ear pain, facial swelling, hearing loss, mouth sores, nosebleeds, postnasal drip, rhinorrhea, sinus pressure, sneezing, sore throat, tinnitus, trouble swallowing and voice change.    Eyes:  Negative for photophobia, pain, discharge, redness, itching and visual disturbance.   Respiratory:  Positive for shortness of breath. Negative for apnea, cough, choking, chest tightness, wheezing and stridor.    Cardiovascular:  Negative  for chest pain, palpitations and leg swelling.   Gastrointestinal:  Negative for abdominal distention, abdominal pain, anal bleeding, blood in stool, constipation, diarrhea, nausea, rectal pain and vomiting.   Endocrine: Negative for cold intolerance, heat intolerance, polydipsia, polyphagia and polyuria.   Genitourinary:  Negative for decreased urine volume, difficulty urinating, dysuria, enuresis, flank pain, frequency, genital sores, hematuria, penile discharge, penile pain, penile swelling, scrotal swelling, testicular pain and urgency.   Musculoskeletal:  Positive for myalgias. Negative for arthralgias, back pain, gait problem, joint swelling, neck pain and neck stiffness.   Skin:  Negative for color change, pallor, rash and wound.   Allergic/Immunologic: Negative for environmental allergies, food allergies and immunocompromised state.   Neurological:  Negative for dizziness, tremors, seizures, syncope, facial asymmetry, speech difficulty, weakness, light-headedness, numbness and headaches.   Hematological:  Negative for adenopathy. Does not bruise/bleed easily.   Psychiatric/Behavioral:  Negative for agitation, behavioral problems, confusion, decreased concentration, dysphoric mood, hallucinations, self-injury, sleep disturbance and suicidal ideas. The patient is not nervous/anxious and is not hyperactive.      Objective:     Vital Signs (Most Recent):  Temp: 98.6 °F (37 °C) (09/18/23 0230)  Pulse: 90 (09/18/23 0300)  Resp: 18 (09/18/23 0300)  BP: (!) 101/58 (09/18/23 0300)  SpO2: 96 % (09/18/23 0300) Vital Signs (24h Range):  Temp:  [98.5 °F (36.9 °C)-101.3 °F (38.5 °C)] 98.6 °F (37 °C)  Pulse:  [] 90  Resp:  [16-26] 18  SpO2:  [95 %-98 %] 96 %  BP: ()/(55-72) 101/58     Weight: 90.7 kg (200 lb)  Body mass index is 28.7 kg/m².     Physical Exam  Constitutional:       General: He is not in acute distress.     Appearance: He is well-developed. He is obese. He is not diaphoretic.   HENT:      Head:  Normocephalic and atraumatic.      Nose: Nose normal.      Mouth/Throat:      Pharynx: No oropharyngeal exudate.   Eyes:      General: No scleral icterus.     Conjunctiva/sclera: Conjunctivae normal.      Pupils: Pupils are equal, round, and reactive to light.   Neck:      Thyroid: No thyromegaly.      Vascular: No JVD.      Trachea: No tracheal deviation.   Cardiovascular:      Rate and Rhythm: Normal rate and regular rhythm.      Heart sounds: Normal heart sounds. No murmur heard.  Pulmonary:      Effort: Pulmonary effort is normal. No respiratory distress.      Breath sounds: Rhonchi present. No wheezing or rales.   Chest:      Chest wall: No tenderness.   Abdominal:      General: Bowel sounds are normal. There is no distension.      Palpations: Abdomen is soft. There is no mass.      Tenderness: There is no abdominal tenderness. There is no guarding or rebound.   Musculoskeletal:         General: No tenderness. Normal range of motion.      Cervical back: Normal range of motion and neck supple.   Lymphadenopathy:      Cervical: No cervical adenopathy.   Skin:     General: Skin is warm and dry.      Findings: No erythema or rash.   Neurological:      Mental Status: He is alert and oriented to person, place, and time.      Cranial Nerves: No cranial nerve deficit.      Motor: No abnormal muscle tone.      Coordination: Coordination normal.      Deep Tendon Reflexes: Reflexes are normal and symmetric. Reflexes normal.   Psychiatric:         Thought Content: Thought content normal.         Judgment: Judgment normal.              CRANIAL NERVES     CN III, IV, VI   Pupils are equal, round, and reactive to light.       Significant Labs: All pertinent labs within the past 24 hours have been reviewed.  Recent Lab Results  (Last 5 results in the past 24 hours)        09/17/23  2328   09/17/23  2245   09/17/23  2235   09/17/23  2108   09/17/23  2054        Albumin               Alkaline Phosphatase               Tara  Test N/A     N/A   N/A         ALT               Anion Gap               Appearance, UA   Clear             AST               Bacteria, UA   Rare             Baso #               Basophil %               Bilirubin (UA)   Negative             BILIRUBIN TOTAL               Blood Culture, Routine         No Growth to date  [P]                No Growth to date  [P]       Site Other     Other   Other         BUN               Calcium               Chloride               CO2               Color, UA   Yellow             Creatinine               Differential Method               eGFR               Eos #               Eosinophil %               Glucose               Glucose, UA   Negative             Gran # (ANC)               Gran %               Hematocrit               Hemoglobin               Immature Grans (Abs)               Immature Granulocytes               Ketones, UA   Negative             Leukocytes, UA   2+             Lipase               Lymph #               Lymph %               Magnesium                MCH               MCHC               MCV               Microscopic Comment   SEE COMMENT  Comment: Other formed elements not mentioned in the report are not   present in the microscopic examination.                Mono #               Mono %               MPV               NITRITE UA   Negative             nRBC               Occult Blood UA   3+             pH, UA   6.0             Platelet Estimate               Platelets               POC BE     -10           POC HCO3     14.4           POC Lactate 2.42       3.68         POC PCO2     23.2           POC PH     7.402           POC PO2     60           POC SATURATED O2     91           POC TCO2     15           Potassium               PROTEIN TOTAL               Protein, UA   Negative  Comment: Recommend a 24 hour urine protein or a urine   protein/creatinine ratio if globulin induced proteinuria is  clinically suspected.               RBC               RBC,  UA   79             RDW               Sample VENOUS     VENOUS   VENOUS         Sodium               Specific [place], UA   1.010             Specimen UA   Urine, Clean Catch             Squam Epithel, UA   2             Troponin I               WBC, UA   17             WBC                                       [P] - Preliminary Result               Significant Imaging: I have reviewed all pertinent imaging results/findings within the past 24 hours.

## 2023-09-18 NOTE — ASSESSMENT & PLAN NOTE
-patient follows with urology at [facility]   -requires intermittent self catheterization at home once every other week per patient   -CT abdomen w/o hydronephrosis   -continue zosyn for suspected catheter induced UTI and follow up with cultures

## 2023-09-18 NOTE — ASSESSMENT & PLAN NOTE
-takes advair daily and albuterol prn at home   -no signs of acute exacerbation   -continue breo daily and duonebs prn

## 2023-09-18 NOTE — CARE UPDATE
Care Update    52 year old male with PMH of asthma, urethral stricture with intermittent self cath who presented to ED for evaluation of SOB and chills. Found to have severe sepsis likely 2/2 urinary source. End organ damage with MONE and transaminitis. US negative. Resolved lactic acidosis and improving AGMA. Continue IVF. AHRF resolved now ambulating on RA.     Plan  - continue IVF  - further assessment of LFTs which are likely ischemic related  - trend Cr prerenal as well  - will need OP urology evaluation   - continue IV abx

## 2023-09-18 NOTE — H&P
[facility] - Emergency Dept  [facility] Medicine  History & Physical    Patient Name: Paul Nagy  MRN: [id number]  Patient Class: IP- Inpatient  Admission Date: 9/17/2023  Attending Physician: Teto Kelley DO   Primary Care Provider: No primary care provider on file.         Patient information was obtained from patient and ER records.     Subjective:     Principal Problem:Severe sepsis    Chief Complaint:   Chief Complaint   Patient presents with    Shortness of Breath     Sudden onset SOB upon waking up just prior to arrival, sating 90% on RA, arrives on 10 L via NRB        HPI: Paul Nagy is a 52 year old male with PMH of asthma, urethral stricture with intermittent self cath who presented to ED for evaluation of SOB and chills. Patient states he has been having dysuria since self catheterization on Saturday and he usually need to self cath once every other week. He reports felling SOB earlier today associated with subjective fever, chills and myalgia while working on his computer which prompted him to call EMS. He denies headache, cough, wheezing, chest pain, palpitation, N/V/D/abdominal pain, hematuria, urgency, focal weakness/numbness, dark stool or bleeding from other sites. [facility].     ED course: febrile 101.3, tachycardia HR 130s and borderline hypotensive BP 90/70. Blood and urine cultures sent. Patient received IVF 30 ml/kg and empiric dose of vancomycin and zosyn per sepsis protocol. WBC 5, Cr 1.7, K 3.2, Mg 1.3, bilirubin 1.6, , . Elevated lactic acid 3.6 which improved to 2.4 on repeat. Troponin negative, COVID negative. CTA chest negative for PE but showed small airway disease, esophageal wall thickening of GERD vs reflux esophagitis . UA 3+ occult blood, 80 RBC, 2+ leukocytes and 17 WBC.  Patient received oral K and IV Mag SO4 replacement. CT abdomen w/o showed no acute process.     During my interview, patient is awake,  conversant. He is not in distress and reports feeling better with sat 96% on 2 liter nasal canula.           Past Medical History:   Diagnosis Date    Asthma     COPD (chronic obstructive pulmonary disease)     HLD (hyperlipidemia)     Hypertension     Obesity, unspecified     Urethral stricture        Past Surgical History:   Procedure Laterality Date    uretheral stritcure         Review of patient's allergies indicates:  No Known Allergies    No current facility-administered medications on file prior to encounter.     Current Outpatient Medications on File Prior to Encounter   Medication Sig    albuterol (PROVENTIL/VENTOLIN HFA) 90 mcg/actuation inhaler INHALE 2 PUFFS BY MOUTH FOUR TIMES A DAY AS NEEDED FOR BREATHING    albuterol-ipratropium (DUO-NEB) 2.5 mg-0.5 mg/3 mL nebulizer solution INHALE 3ML BY NEBULIZER FOUR TIMES A DAY AS NEEDED TO OPEN AIRWAYS    fluticasone-salmeterol diskus inhaler 500-50 mcg Inhale 1 puff into the lungs 2 (two) times daily. Controller    aspirin (ECOTRIN) 81 MG EC tablet Take 81 mg by mouth once daily.    vitamin D (VITAMIN D3) 1000 units Tab Take 1,000 Units by mouth once daily.     Family History    None       Tobacco Use    Smoking status: Some Days     Types: Cigarettes    Smokeless tobacco: Never   Substance and Sexual Activity    Alcohol use: Yes     Comment: 2-3x weekly    Drug use: Never    Sexual activity: Not Currently     Review of Systems   Constitutional:  Positive for chills, fatigue and fever. Negative for activity change, appetite change, diaphoresis and unexpected weight change.   HENT:  Negative for congestion, dental problem, drooling, ear discharge, ear pain, facial swelling, hearing loss, mouth sores, nosebleeds, postnasal drip, rhinorrhea, sinus pressure, sneezing, sore throat, tinnitus, trouble swallowing and voice change.    Eyes:  Negative for photophobia, pain, discharge, redness, itching and visual disturbance.   Respiratory:  Positive for  shortness of breath. Negative for apnea, cough, choking, chest tightness, wheezing and stridor.    Cardiovascular:  Negative for chest pain, palpitations and leg swelling.   Gastrointestinal:  Negative for abdominal distention, abdominal pain, anal bleeding, blood in stool, constipation, diarrhea, nausea, rectal pain and vomiting.   Endocrine: Negative for cold intolerance, heat intolerance, polydipsia, polyphagia and polyuria.   Genitourinary:  Negative for decreased urine volume, difficulty urinating, dysuria, enuresis, flank pain, frequency, genital sores, hematuria, penile discharge, penile pain, penile swelling, scrotal swelling, testicular pain and urgency.   Musculoskeletal:  Positive for myalgias. Negative for arthralgias, back pain, gait problem, joint swelling, neck pain and neck stiffness.   Skin:  Negative for color change, pallor, rash and wound.   Allergic/Immunologic: Negative for environmental allergies, food allergies and immunocompromised state.   Neurological:  Negative for dizziness, tremors, seizures, syncope, facial asymmetry, speech difficulty, weakness, light-headedness, numbness and headaches.   Hematological:  Negative for adenopathy. Does not bruise/bleed easily.   Psychiatric/Behavioral:  Negative for agitation, behavioral problems, confusion, decreased concentration, dysphoric mood, hallucinations, self-injury, sleep disturbance and suicidal ideas. The patient is not nervous/anxious and is not hyperactive.      Objective:     Vital Signs (Most Recent):  Temp: 98.6 °F (37 °C) (09/18/23 0230)  Pulse: 90 (09/18/23 0300)  Resp: 18 (09/18/23 0300)  BP: (!) 101/58 (09/18/23 0300)  SpO2: 96 % (09/18/23 0300) Vital Signs (24h Range):  Temp:  [98.5 °F (36.9 °C)-101.3 °F (38.5 °C)] 98.6 °F (37 °C)  Pulse:  [] 90  Resp:  [16-26] 18  SpO2:  [95 %-98 %] 96 %  BP: ()/(55-72) 101/58     Weight: 90.7 kg (200 lb)  Body mass index is 28.7 kg/m².     Physical Exam  Constitutional:        General: He is not in acute distress.     Appearance: He is well-developed. He is obese. He is not diaphoretic.   HENT:      Head: Normocephalic and atraumatic.      Nose: Nose normal.      Mouth/Throat:      Pharynx: No oropharyngeal exudate.   Eyes:      General: No scleral icterus.     Conjunctiva/sclera: Conjunctivae normal.      Pupils: Pupils are equal, round, and reactive to light.   Neck:      Thyroid: No thyromegaly.      Vascular: No JVD.      Trachea: No tracheal deviation.   Cardiovascular:      Rate and Rhythm: Normal rate and regular rhythm.      Heart sounds: Normal heart sounds. No murmur heard.  Pulmonary:      Effort: Pulmonary effort is normal. No respiratory distress.      Breath sounds: Rhonchi present. No wheezing or rales.   Chest:      Chest wall: No tenderness.   Abdominal:      General: Bowel sounds are normal. There is no distension.      Palpations: Abdomen is soft. There is no mass.      Tenderness: There is no abdominal tenderness. There is no guarding or rebound.   Musculoskeletal:         General: No tenderness. Normal range of motion.      Cervical back: Normal range of motion and neck supple.   Lymphadenopathy:      Cervical: No cervical adenopathy.   Skin:     General: Skin is warm and dry.      Findings: No erythema or rash.   Neurological:      Mental Status: He is alert and oriented to person, place, and time.      Cranial Nerves: No cranial nerve deficit.      Motor: No abnormal muscle tone.      Coordination: Coordination normal.      Deep Tendon Reflexes: Reflexes are normal and symmetric. Reflexes normal.   Psychiatric:         Thought Content: Thought content normal.         Judgment: Judgment normal.              CRANIAL NERVES     CN III, IV, VI   Pupils are equal, round, and reactive to light.       Significant Labs: All pertinent labs within the past 24 hours have been reviewed.  Recent Lab Results  (Last 5 results in the past 24 hours)        09/17/23  9332    09/17/23  2245   09/17/23  2235   09/17/23  2108   09/17/23 2054        Albumin               Alkaline Phosphatase               Allens Test N/A     N/A   N/A         ALT               Anion Gap               Appearance, UA   Clear             AST               Bacteria, UA   Rare             Baso #               Basophil %               Bilirubin (UA)   Negative             BILIRUBIN TOTAL               Blood Culture, Routine         No Growth to date  [P]                No Growth to date  [P]       Site Other     Other   Other         BUN               Calcium               Chloride               CO2               Color, UA   Yellow             Creatinine               Differential Method               eGFR               Eos #               Eosinophil %               Glucose               Glucose, UA   Negative             Gran # (ANC)               Gran %               Hematocrit               Hemoglobin               Immature Grans (Abs)               Immature Granulocytes               Ketones, UA   Negative             Leukocytes, UA   2+             Lipase               Lymph #               Lymph %               Magnesium                MCH               MCHC               MCV               Microscopic Comment   SEE COMMENT  Comment: Other formed elements not mentioned in the report are not   present in the microscopic examination.                Mono #               Mono %               MPV               NITRITE UA   Negative             nRBC               Occult Blood UA   3+             pH, UA   6.0             Platelet Estimate               Platelets               POC BE     -10           POC HCO3     14.4           POC Lactate 2.42       3.68         POC PCO2     23.2           POC PH     7.402           POC PO2     60           POC SATURATED O2     91           POC TCO2     15           Potassium               PROTEIN TOTAL               Protein, UA   Negative  Comment: Recommend a 24 hour urine  "protein or a urine   protein/creatinine ratio if globulin induced proteinuria is  clinically suspected.               RBC               RBC, UA   79             RDW               Sample VENOUS     VENOUS   VENOUS         Sodium               Specific [place], UA   1.010             Specimen UA   Urine, Clean Catch             Squam Epithel, UA   2             Troponin I               WBC, UA   17             WBC                                       [P] - Preliminary Result               Significant Imaging: I have reviewed all pertinent imaging results/findings within the past 24 hours.    Assessment/Plan:     * Severe sepsis  This patient does have evidence of infective focus  My overall impression is severe sepsis .  Source: Respiratory and Urinary Tract with developing Pneumonia and UTI   Antibiotics given-   Antibiotics (72h ago, onward)    Start     Stop Route Frequency Ordered    09/18/23 0500  piperacillin-tazobactam (ZOSYN) 4.5 g in dextrose 5 % in water (D5W) 100 mL IVPB (MB+)         -- IV Every 8 hours (non-standard times) 09/18/23 0336        Latest lactate reviewed-  No results for input(s): "LACTATE" in the last 72 hours.  Organ dysfunction indicated by Acute kidney injury    Fluid challenge Actual Body weight- Patient will receive 30ml/kg actual body weight to calculate fluid bolus for treatment of septic shock.     Post- resuscitation assessment Yes Perfusion exam was performed within 6 hours of septic shock presentation after bolus shows Adequate tissue perfusion assessed by non-invasive monitoring       Will Not start Pressors-   Source control achieved by:  -continue zosyn for empiric pulmonary and  coverage   -lactic acid from improved 3.6 to 2.4 after IVF  -CT abdomen w/o acute process   -follow up with blood and urine cultures     Acute hypoxemic respiratory failure  Patient with Hypoxic Respiratory failure which is Acute.  he is not on home oxygen. Supplemental oxygen was provided and " noted-      .   Signs/symptoms of respiratory failure include- tachypnea and increased work of breathing. Contributing diagnoses includes - Pneumonia Labs and images were reviewed. Patient Has not had a recent ABG. Will treat underlying causes and adjust management of respiratory failure as follows-   -continue empiric zosyn and follow up with blood cultures   -continue supplemental oxygen with goal sat ~94%    MONE (acute kidney injury)  Patient with acute kidney injury/acute renal failure likely due to acute tubular necrosis caused by severe sepsis and transient hypotension  MONE is currently stable. Baseline creatinine unknown - Labs reviewed- Renal function/electrolytes with Estimated Creatinine Clearance: 56.9 mL/min (A) (based on SCr of 1.7 mg/dL (H)). according to latest data. Monitor urine output and serial BMP and adjust therapy as needed. Avoid nephrotoxins and renally dose meds for GFR listed above.    Hypokalemia  -replaced with oral K   -follow up with repeat        Hypomagnesemia  Patient has Abnormal Magnesium: hypomagnesemia. Will continue to monitor electrolytes closely. Will replace the affected electrolytes and repeat labs to be done after interventions completed. The patient's magnesium results have been reviewed and are listed below.  Recent Labs   Lab 09/17/23  2052   MG 1.3*        Mild intermittent asthma  -takes advair daily and albuterol prn at home   -no signs of acute exacerbation   -continue breo daily and duonebs prn       Gastroesophageal reflux disease with esophagitis without hemorrhage  -seen on CTA chest   -will start on protonix daily       H/O urethral stricture  -[facility]   -requires intermittent self catheterization at home once every other week per patient   -CT abdomen w/o hydronephrosis   -continue zosyn for suspected catheter induced UTI and follow up with cultures         VTE Risk Mitigation (From admission, onward)         Ordered      enoxaparin injection 40 mg  Every 24 hours         09/18/23 0423     IP VTE LOW RISK PATIENT  Once         09/18/23 0219     Place sequential compression device  Until discontinued         09/18/23 0219                   Nova Boland DO  Department of Hospital Medicine  [facility] - Emergency Dept

## 2023-09-19 LAB
ACINETOBACTER CALCOACETICUS/BAUMANNII COMPLEX: NOT DETECTED
ALBUMIN SERPL BCP-MCNC: 2.8 G/DL (ref 3.5–5.2)
ALP SERPL-CCNC: 103 U/L (ref 55–135)
ALT SERPL W/O P-5'-P-CCNC: 132 U/L (ref 10–44)
ANION GAP SERPL CALC-SCNC: 7 MMOL/L (ref 8–16)
ASCENDING AORTA: 3.58 CM
AST SERPL-CCNC: 82 U/L (ref 10–40)
AV INDEX (PROSTH): 0.92
AV MEAN GRADIENT: 6 MMHG
AV PEAK GRADIENT: 9 MMHG
AV VALVE AREA BY VELOCITY RATIO: 4 CM²
AV VALVE AREA: 3.76 CM²
AV VELOCITY RATIO: 0.98
BACTEROIDES FRAGILIS: NOT DETECTED
BASOPHILS # BLD AUTO: 0.14 K/UL (ref 0–0.2)
BASOPHILS # BLD AUTO: ABNORMAL K/UL (ref 0–0.2)
BASOPHILS NFR BLD: 0 % (ref 0–1.9)
BASOPHILS NFR BLD: 0.7 % (ref 0–1.9)
BILIRUB SERPL-MCNC: 1.5 MG/DL (ref 0.1–1)
BSA FOR ECHO PROCEDURE: 2.12 M2
BUN SERPL-MCNC: 14 MG/DL (ref 6–20)
CALCIUM SERPL-MCNC: 8.6 MG/DL (ref 8.7–10.5)
CANDIDA ALBICANS: NOT DETECTED
CANDIDA AURIS: NOT DETECTED
CANDIDA GLABRATA: NOT DETECTED
CANDIDA KRUSEI: NOT DETECTED
CANDIDA PARAPSILOSIS: NOT DETECTED
CANDIDA TROPICALIS: NOT DETECTED
CHLORIDE SERPL-SCNC: 111 MMOL/L (ref 95–110)
CO2 SERPL-SCNC: 20 MMOL/L (ref 23–29)
CREAT SERPL-MCNC: 1.5 MG/DL (ref 0.5–1.4)
CRYPTOCOCCUS NEOFORMANS/GATTII: NOT DETECTED
CTX-M GENE: ABNORMAL
CV ECHO LV RWT: 0.39 CM
DIFFERENTIAL METHOD: ABNORMAL
DIFFERENTIAL METHOD: ABNORMAL
DOHLE BOD BLD QL SMEAR: PRESENT
DOP CALC AO PEAK VEL: 1.52 M/S
DOP CALC AO VTI: 26.7 CM
DOP CALC LVOT AREA: 4.1 CM2
DOP CALC LVOT DIAMETER: 2.28 CM
DOP CALC LVOT PEAK VEL: 1.49 M/S
DOP CALC LVOT STROKE VOLUME: 100.51 CM3
DOP CALCLVOT PEAK VEL VTI: 24.63 CM
E WAVE DECELERATION TIME: 180.86 MSEC
E/A RATIO: 1
E/E' RATIO: 9.38 M/S
ECHO LV POSTERIOR WALL: 0.89 CM (ref 0.6–1.1)
ENTEROBACTER CLOACAE COMPLEX: NOT DETECTED
ENTEROBACTERALES: NOT DETECTED
ENTEROCOCCUS FAECALIS: NOT DETECTED
ENTEROCOCCUS FAECIUM: NOT DETECTED
EOSINOPHIL # BLD AUTO: 0.2 K/UL (ref 0–0.5)
EOSINOPHIL # BLD AUTO: ABNORMAL K/UL (ref 0–0.5)
EOSINOPHIL NFR BLD: 0.7 % (ref 0–8)
EOSINOPHIL NFR BLD: 1 % (ref 0–8)
ERYTHROCYTE [DISTWIDTH] IN BLOOD BY AUTOMATED COUNT: 13.2 % (ref 11.5–14.5)
ERYTHROCYTE [DISTWIDTH] IN BLOOD BY AUTOMATED COUNT: 13.2 % (ref 11.5–14.5)
ESCHERICHIA COLI: NOT DETECTED
EST. GFR  (NO RACE VARIABLE): 55.3 ML/MIN/1.73 M^2
FRACTIONAL SHORTENING: 26 % (ref 28–44)
GLUCOSE SERPL-MCNC: 98 MG/DL (ref 70–110)
HAEMOPHILUS INFLUENZAE: NOT DETECTED
HCT VFR BLD AUTO: 39 % (ref 40–54)
HCT VFR BLD AUTO: 42.6 % (ref 40–54)
HGB BLD-MCNC: 13.8 G/DL (ref 14–18)
HGB BLD-MCNC: 14.4 G/DL (ref 14–18)
IMM GRANULOCYTES # BLD AUTO: 0.73 K/UL (ref 0–0.04)
IMM GRANULOCYTES # BLD AUTO: ABNORMAL K/UL (ref 0–0.04)
IMM GRANULOCYTES NFR BLD AUTO: 3.4 % (ref 0–0.5)
IMM GRANULOCYTES NFR BLD AUTO: ABNORMAL % (ref 0–0.5)
IMP GENE: ABNORMAL
INTERVENTRICULAR SEPTUM: 1.18 CM (ref 0.6–1.1)
IVRT: 78.02 MSEC
KLEBSIELLA AEROGENES: NOT DETECTED
KLEBSIELLA OXYTOCA: NOT DETECTED
KLEBSIELLA PNEUMONIAE GROUP: NOT DETECTED
KPC: ABNORMAL
LA MAJOR: 5.27 CM
LA MINOR: 4.96 CM
LA WIDTH: 3.44 CM
LEFT ATRIUM SIZE: 3.1 CM
LEFT ATRIUM VOLUME INDEX MOD: 25.9 ML/M2
LEFT ATRIUM VOLUME INDEX: 22.2 ML/M2
LEFT ATRIUM VOLUME MOD: 54.23 CM3
LEFT ATRIUM VOLUME: 46.32 CM3
LEFT INTERNAL DIMENSION IN SYSTOLE: 3.39 CM (ref 2.1–4)
LEFT VENTRICLE DIASTOLIC VOLUME INDEX: 45.79 ML/M2
LEFT VENTRICLE DIASTOLIC VOLUME: 95.7 ML
LEFT VENTRICLE MASS INDEX: 79 G/M2
LEFT VENTRICLE SYSTOLIC VOLUME INDEX: 22.5 ML/M2
LEFT VENTRICLE SYSTOLIC VOLUME: 47.02 ML
LEFT VENTRICULAR INTERNAL DIMENSION IN DIASTOLE: 4.57 CM (ref 3.5–6)
LEFT VENTRICULAR MASS: 164.76 G
LISTERIA MONOCYTOGENES: NOT DETECTED
LV LATERAL E/E' RATIO: 9.38 M/S
LV SEPTAL E/E' RATIO: 9.38 M/S
LYMPHOCYTES # BLD AUTO: 1.2 K/UL (ref 1–4.8)
LYMPHOCYTES # BLD AUTO: ABNORMAL K/UL (ref 1–4.8)
LYMPHOCYTES NFR BLD: 5 % (ref 18–48)
LYMPHOCYTES NFR BLD: 5.8 % (ref 18–48)
MAGNESIUM SERPL-MCNC: 1.9 MG/DL (ref 1.6–2.6)
MCH RBC QN AUTO: 31.5 PG (ref 27–31)
MCH RBC QN AUTO: 32.3 PG (ref 27–31)
MCHC RBC AUTO-ENTMCNC: 33.8 G/DL (ref 32–36)
MCHC RBC AUTO-ENTMCNC: 35.4 G/DL (ref 32–36)
MCR-1: ABNORMAL
MCV RBC AUTO: 91 FL (ref 82–98)
MCV RBC AUTO: 93 FL (ref 82–98)
MEC A/C AND MREJ (MRSA): ABNORMAL
MEC A/C: ABNORMAL
METAMYELOCYTES NFR BLD MANUAL: 3 %
MONOCYTES # BLD AUTO: 1.2 K/UL (ref 0.3–1)
MONOCYTES # BLD AUTO: ABNORMAL K/UL (ref 0.3–1)
MONOCYTES NFR BLD: 4 % (ref 4–15)
MONOCYTES NFR BLD: 5.4 % (ref 4–15)
MV PEAK A VEL: 0.75 M/S
MV PEAK E VEL: 0.75 M/S
NDM GENE: ABNORMAL
NEISSERIA MENINGITIDIS: NOT DETECTED
NEUTROPHILS # BLD AUTO: 18 K/UL (ref 1.8–7.7)
NEUTROPHILS NFR BLD: 75 % (ref 38–73)
NEUTROPHILS NFR BLD: 84 % (ref 38–73)
NEUTS BAND NFR BLD MANUAL: 12 %
NRBC BLD-RTO: 0 /100 WBC
NRBC BLD-RTO: 0 /100 WBC
OXA-48-LIKE: ABNORMAL
PATH REV BLD -IMP: NORMAL
PHOSPHATE SERPL-MCNC: 2.9 MG/DL (ref 2.7–4.5)
PISA TR MAX VEL: 2.85 M/S
PLATELET # BLD AUTO: 112 K/UL (ref 150–450)
PLATELET # BLD AUTO: 99 K/UL (ref 150–450)
PLATELET BLD QL SMEAR: ABNORMAL
PMV BLD AUTO: 11 FL (ref 9.2–12.9)
PMV BLD AUTO: 11.6 FL (ref 9.2–12.9)
POTASSIUM SERPL-SCNC: 4.2 MMOL/L (ref 3.5–5.1)
PROT SERPL-MCNC: 5.6 G/DL (ref 6–8.4)
PROTEUS SPECIES: NOT DETECTED
PSEUDOMONAS AERUGINOSA: NOT DETECTED
RA MAJOR: 4.75 CM
RA PRESSURE ESTIMATED: 3 MMHG
RA WIDTH: 2.61 CM
RBC # BLD AUTO: 4.27 M/UL (ref 4.6–6.2)
RBC # BLD AUTO: 4.57 M/UL (ref 4.6–6.2)
RIGHT VENTRICULAR END-DIASTOLIC DIMENSION: 3.22 CM
RV TB RVSP: 6 MMHG
SALMONELLA SP: NOT DETECTED
SERRATIA MARCESCENS: NOT DETECTED
SINUS: 3.8 CM
SODIUM SERPL-SCNC: 138 MMOL/L (ref 136–145)
STAPHYLOCOCCUS AUREUS: NOT DETECTED
STAPHYLOCOCCUS EPIDERMIDIS: NOT DETECTED
STAPHYLOCOCCUS LUGDUNESIS: NOT DETECTED
STAPHYLOCOCCUS SPECIES: DETECTED
STENOTROPHOMONAS MALTOPHILIA: NOT DETECTED
STJ: 3.24 CM
STREPTOCOCCUS AGALACTIAE: NOT DETECTED
STREPTOCOCCUS PNEUMONIAE: NOT DETECTED
STREPTOCOCCUS PYOGENES: NOT DETECTED
STREPTOCOCCUS SPECIES: NOT DETECTED
TDI LATERAL: 0.08 M/S
TDI SEPTAL: 0.08 M/S
TDI: 0.08 M/S
TOXIC GRANULES BLD QL SMEAR: PRESENT
TR MAX PG: 32 MMHG
TRICUSPID ANNULAR PLANE SYSTOLIC EXCURSION: 1.37 CM
TV REST PULMONARY ARTERY PRESSURE: 35 MMHG
VAN A/B: ABNORMAL
VANCOMYCIN SERPL-MCNC: 4.6 UG/ML
VIM GENE: ABNORMAL
WBC # BLD AUTO: 21.48 K/UL (ref 3.9–12.7)
WBC # BLD AUTO: 21.59 K/UL (ref 3.9–12.7)
WBC TOXIC VACUOLES BLD QL SMEAR: PRESENT
Z-SCORE OF LEFT VENTRICULAR DIMENSION IN END DIASTOLE: -3.44
Z-SCORE OF LEFT VENTRICULAR DIMENSION IN END SYSTOLE: -1.19

## 2023-09-19 PROCEDURE — 63600175 PHARM REV CODE 636 W HCPCS: Performed by: STUDENT IN AN ORGANIZED HEALTH CARE EDUCATION/TRAINING PROGRAM

## 2023-09-19 PROCEDURE — 85060 PATHOLOGIST REVIEW: ICD-10-PCS | Mod: ,,, | Performed by: PATHOLOGY

## 2023-09-19 PROCEDURE — 36415 COLL VENOUS BLD VENIPUNCTURE: CPT | Performed by: HOSPITALIST

## 2023-09-19 PROCEDURE — 84100 ASSAY OF PHOSPHORUS: CPT | Performed by: HOSPITALIST

## 2023-09-19 PROCEDURE — 36415 COLL VENOUS BLD VENIPUNCTURE: CPT | Performed by: STUDENT IN AN ORGANIZED HEALTH CARE EDUCATION/TRAINING PROGRAM

## 2023-09-19 PROCEDURE — 85025 COMPLETE CBC W/AUTO DIFF WBC: CPT | Performed by: STUDENT IN AN ORGANIZED HEALTH CARE EDUCATION/TRAINING PROGRAM

## 2023-09-19 PROCEDURE — 21400001 HC TELEMETRY ROOM

## 2023-09-19 PROCEDURE — 83735 ASSAY OF MAGNESIUM: CPT | Performed by: HOSPITALIST

## 2023-09-19 PROCEDURE — 25000003 PHARM REV CODE 250: Performed by: HOSPITALIST

## 2023-09-19 PROCEDURE — 87040 BLOOD CULTURE FOR BACTERIA: CPT | Performed by: HOSPITALIST

## 2023-09-19 PROCEDURE — 80053 COMPREHEN METABOLIC PANEL: CPT | Performed by: HOSPITALIST

## 2023-09-19 PROCEDURE — 85007 BL SMEAR W/DIFF WBC COUNT: CPT | Performed by: HOSPITALIST

## 2023-09-19 PROCEDURE — 63600175 PHARM REV CODE 636 W HCPCS: Performed by: HOSPITALIST

## 2023-09-19 PROCEDURE — 85060 BLOOD SMEAR INTERPRETATION: CPT | Mod: ,,, | Performed by: PATHOLOGY

## 2023-09-19 PROCEDURE — 85027 COMPLETE CBC AUTOMATED: CPT | Performed by: HOSPITALIST

## 2023-09-19 PROCEDURE — 80202 ASSAY OF VANCOMYCIN: CPT | Performed by: HOSPITALIST

## 2023-09-19 PROCEDURE — 25000003 PHARM REV CODE 250: Performed by: STUDENT IN AN ORGANIZED HEALTH CARE EDUCATION/TRAINING PROGRAM

## 2023-09-19 RX ORDER — HYDROXYZINE HYDROCHLORIDE 25 MG/1
25 TABLET, FILM COATED ORAL NIGHTLY PRN
Status: DISCONTINUED | OUTPATIENT
Start: 2023-09-19 | End: 2023-09-20 | Stop reason: HOSPADM

## 2023-09-19 RX ORDER — PANTOPRAZOLE SODIUM 20 MG/1
20 TABLET, DELAYED RELEASE ORAL DAILY
Qty: 30 TABLET | Refills: 11 | Status: SHIPPED | OUTPATIENT
Start: 2023-09-19 | End: 2024-09-18

## 2023-09-19 RX ORDER — TALC
9 POWDER (GRAM) TOPICAL NIGHTLY PRN
Status: DISCONTINUED | OUTPATIENT
Start: 2023-09-19 | End: 2023-09-20 | Stop reason: HOSPADM

## 2023-09-19 RX ORDER — PANTOPRAZOLE SODIUM 20 MG/1
20 TABLET, DELAYED RELEASE ORAL DAILY
Status: DISCONTINUED | OUTPATIENT
Start: 2023-09-19 | End: 2023-09-20 | Stop reason: HOSPADM

## 2023-09-19 RX ADMIN — PANTOPRAZOLE SODIUM 20 MG: 20 TABLET, DELAYED RELEASE ORAL at 04:09

## 2023-09-19 RX ADMIN — Medication 9 MG: at 08:09

## 2023-09-19 RX ADMIN — PIPERACILLIN SODIUM AND TAZOBACTAM SODIUM 4.5 G: 4; .5 INJECTION, POWDER, FOR SOLUTION INTRAVENOUS at 08:09

## 2023-09-19 RX ADMIN — PIPERACILLIN SODIUM AND TAZOBACTAM SODIUM 4.5 G: 4; .5 INJECTION, POWDER, FOR SOLUTION INTRAVENOUS at 05:09

## 2023-09-19 RX ADMIN — HYDROXYZINE HYDROCHLORIDE 25 MG: 25 TABLET, FILM COATED ORAL at 08:09

## 2023-09-19 RX ADMIN — PIPERACILLIN SODIUM AND TAZOBACTAM SODIUM 4.5 G: 4; .5 INJECTION, POWDER, FOR SOLUTION INTRAVENOUS at 12:09

## 2023-09-19 RX ADMIN — ASPIRIN 81 MG: 81 TABLET, COATED ORAL at 09:09

## 2023-09-19 RX ADMIN — VANCOMYCIN HYDROCHLORIDE 1750 MG: 500 INJECTION, POWDER, LYOPHILIZED, FOR SOLUTION INTRAVENOUS at 06:09

## 2023-09-19 RX ADMIN — CHOLECALCIFEROL TAB 25 MCG (1000 UNIT) 1000 UNITS: 25 TAB at 09:09

## 2023-09-19 NOTE — ASSESSMENT & PLAN NOTE
-patient follows with urology at [facility]   -requires intermittent self catheterization at home once every other week per patient   -CT abdomen w/o hydronephrosis   -continue zosyn for suspected catheter induced UTI and follow up with cultures   - growing GNR pending speciation

## 2023-09-19 NOTE — PROGRESS NOTES
Pharmacokinetic Initial Assessment: IV Vancomycin    Assessment/Plan:    Initiate intravenous vancomycin with loading dose of 1750 mg once with subsequent doses when random concentrations are less than 20 mcg/mL  Desired empiric serum trough concentration is 15 to 20 mcg/mL  Draw vancomycin random level on 9/20 at 0500.  Pharmacy will continue to follow and monitor vancomycin.      Please contact pharmacy at extension [phone] with any questions regarding this assessment.     Thank you for the consult,   Delores Kiara       Patient brief summary:  Paul Nagy is a 52 y.o. male initiated on antimicrobial therapy with IV Vancomycin for treatment of suspected bacteremia    Drug Allergies:   Review of patient's allergies indicates:  No Known Allergies    Actual Body Weight:   90.7 kg    Renal Function:   Estimated Creatinine Clearance: 64.5 mL/min (A) (based on SCr of 1.5 mg/dL (H)).    Dialysis Method (if applicable):  N/A    CBC (last 72 hours):  Recent Labs   Lab Result Units 09/17/23 2052 09/18/23 0416 09/19/23  0240   WBC K/uL 5.13 13.62* 21.59*   Hemoglobin g/dL 15.5 13.6* 13.8*   Hematocrit % 45.2 40.3 39.0*   Platelets K/uL 130* 112* 99*   Gran % % 91.9* 87.6*  --    Lymph % % 4.1* 4.9*  --    Mono % % 0.8* 5.7  --    Eosinophil % % 0.8 0.1  --    Basophil % % 0.8 0.5  --    Differential Method  Automated Automated  --        Metabolic Panel (last 72 hours):  Recent Labs   Lab Result Units 09/17/23 2052 09/17/23 2245 09/18/23 0416 09/19/23  0240   Sodium mmol/L 138  --  135* 138   Potassium mmol/L 3.2*  --  4.1 4.2   Chloride mmol/L 113*  --  111* 111*   CO2 mmol/L 13*  --  16* 20*   Glucose mg/dL 115*  --  166* 98   Glucose, UA   --  Negative  --   --    BUN mg/dL 19  --  16 14   Creatinine mg/dL 1.7*  --  1.7* 1.5*   Albumin g/dL 2.9*  --  2.7* 2.8*   Total Bilirubin mg/dL 1.6*  --  1.6* 1.5*   Alkaline Phosphatase U/L 122  --  85 103   AST U/L 250*  --  274* 82*   ALT U/L 121*  --  204* 132*    Magnesium mg/dL 1.3*  --  4.1* 1.9   Phosphorus mg/dL  --   --  2.2* 2.9       Drug levels (last 3 results):  Recent Labs   Lab Result Units 09/19/23  0240   Vancomycin, Random ug/mL 4.6       Microbiologic Results:  Microbiology Results (last 7 days)       Procedure Component Value Units Date/Time    Blood culture [[id number]] Collected: 09/19/23 0540    Order Status: Sent Specimen: Blood Updated: 09/19/23 0546    Blood culture [[id number]] Collected: 09/19/23 0541    Order Status: Sent Specimen: Blood Updated: 09/19/23 0546    Rapid Organism ID by PCR (from Blood culture) [[id number]]  (Abnormal) Collected: 09/17/23 2054    Order Status: Completed Updated: 09/19/23 0329     Enterococcus faecalis Not Detected     Enterococcus faecium Not Detected     Listeria monocytogenes Not Detected     Staphylococcus spp. Detected     Staphylococcus aureus Not Detected     Staphylococcus epidermidis Not Detected     Staphylococcus lugdunensis Not Detected     Streptococcus species Not Detected     Streptococcus agalactiae Not Detected     Streptococcus pneumoniae Not Detected     Streptococcus pyogenes Not Detected     Acinetobacter calcoaceticus/baumannii complex Not Detected     Bacteroides fragilis Not Detected     Enterobacterales Not Detected     Enterobacter cloacae complex Not Detected     Escherichia coli Not Detected     Klebsiella aerogenes Not Detected     Klebsiella oxytoca Not Detected     Klebsiella pneumoniae group Not Detected     Proteus Not Detected     Salmonella sp Not Detected     Serratia marcescens Not Detected     Haemophilus influenzae Not Detected     Neisseria meningtidis Not Detected     Pseudomonas aeruginosa Not Detected     Stenotrophomonas maltophilia Not Detected     Candida albicans Not Detected     Candida auris Not Detected     Candida glabrata Not Detected     Candida krusei Not Detected     Candida parapsilosis Not Detected     Candida tropicalis Not Detected     Cryptococcus  neoformans/gattii Not Detected     CTX-M (ESBL ) Test Not Applicable     IMP (Carbapenem resistant) Test Not Applicable     KPC resistance gene (Carbapenem resistant) Test Not Applicable     mcr-1  Test Not Applicable     mec A/C  Test Not Applicable     mec A/C and MREJ (MRSA) gene Test Not Applicable     NDM (Carbapenem resistant) Test Not Applicable     OXA-48-like (Carbapenem resistant) Test Not Applicable     van A/B (VRE gene) Test Not Applicable     VIM (Carbapenem resistant) Test Not Applicable    Narrative:      Aerobic and anaerobic    Urine culture [[id number]]  (Abnormal) Collected: 09/17/23 2245    Order Status: Completed Specimen: Urine Updated: 09/19/23 0158     Urine Culture, Routine GRAM NEGATIVE ORI  10,000 - 49,999 cfu/ml  Identification and susceptibility pending  No other significant isolate      Narrative:      Specimen Source->Urine    Blood culture x two cultures. Draw prior to antibiotics. [[id number]] Collected: 09/17/23 2054    Order Status: Completed Specimen: Blood from Peripheral, Hand, Left Updated: 09/18/23 2212     Blood Culture, Routine No Growth to date      No Growth to date    Narrative:      Aerobic and anaerobic    Blood culture x two cultures. Draw prior to antibiotics. [[id number]] Collected: 09/17/23 2054    Order Status: Completed Specimen: Blood from Peripheral, Antecubital, Right Updated: 09/18/23 2212     Blood Culture, Routine Gram stain jose bottle: Gram positive cocci in clusters resembling Staph      Results called to and read back by:Kristie Norton RN 09/18/2023  22:11    Narrative:      Aerobic and anaerobic

## 2023-09-19 NOTE — ASSESSMENT & PLAN NOTE
Patient with acute kidney injury/acute renal failure likely due to acute tubular necrosis caused by severe sepsis and transient hypotension  MONE is currently stable. Baseline creatinine unknown - Labs reviewed- Renal function/electrolytes with Estimated Creatinine Clearance: 64.5 mL/min (A) (based on SCr of 1.5 mg/dL (H)). according to latest data. Monitor urine output and serial BMP and adjust therapy as needed. Avoid nephrotoxins and renally dose meds for GFR listed above.    Unclear baseline as [facility] but likely is now back at baseline with resolved hypotension

## 2023-09-19 NOTE — HOSPITAL COURSE
Patient admitted to  for management of sepsis associated with likely UTI. Started on zosyn. Bcx 1 bottle with coag negative staph. Vanc discontinued. GNR in urine culture with pending speciation. Symptomatically improved and plan for DC once cultures result for prolonged PO course of abx. Elevated WBC likely reactive to improving infection as L shift decreased. Thrombocytopenia noted as well. WBC improving and thrombocytopenia improving as well. Likely result of sepsis. Smear demonstrate reactive process. Resolving transaminitis and MONE as well with resolved shock.   Urine Cx grew citrobacter sensitive to bactrim and plan to complete 14 day course ending on 10/2. Patient instructed to obtain urology follow up as well as PCP.

## 2023-09-19 NOTE — SUBJECTIVE & OBJECTIVE
Interval History: see hospital course. Improved symptoms. Elevated WBC and thrombocytopenia. Bcx likely contaminant and will follow up. Awaiting Ucx for PO abx and duration.     Review of Systems   Constitutional:  Positive for fatigue. Negative for activity change, appetite change, chills, diaphoresis, fever and unexpected weight change.   HENT:  Negative for congestion, dental problem, drooling, ear discharge, ear pain, facial swelling, hearing loss, mouth sores, nosebleeds, postnasal drip, rhinorrhea, sinus pressure, sneezing, sore throat, tinnitus, trouble swallowing and voice change.    Eyes:  Negative for photophobia, pain, discharge, redness, itching and visual disturbance.   Respiratory:  Negative for apnea, cough, choking, chest tightness, shortness of breath, wheezing and stridor.    Cardiovascular:  Negative for chest pain, palpitations and leg swelling.   Gastrointestinal:  Negative for abdominal distention, abdominal pain, anal bleeding, blood in stool, constipation, diarrhea, nausea, rectal pain and vomiting.   Endocrine: Negative for cold intolerance, heat intolerance, polydipsia, polyphagia and polyuria.   Genitourinary:  Negative for decreased urine volume, difficulty urinating, dysuria, enuresis, flank pain, frequency, genital sores, hematuria, penile discharge, penile pain, penile swelling, scrotal swelling, testicular pain and urgency.   Musculoskeletal:  Positive for myalgias. Negative for arthralgias, back pain, gait problem, joint swelling, neck pain and neck stiffness.   Skin:  Negative for color change, pallor, rash and wound.   Allergic/Immunologic: Negative for environmental allergies, food allergies and immunocompromised state.   Neurological:  Negative for dizziness, tremors, seizures, syncope, facial asymmetry, speech difficulty, weakness, light-headedness, numbness and headaches.   Hematological:  Negative for adenopathy. Does not bruise/bleed easily.   Psychiatric/Behavioral:   Negative for agitation, behavioral problems, confusion, decreased concentration, dysphoric mood, hallucinations, self-injury, sleep disturbance and suicidal ideas. The patient is not nervous/anxious and is not hyperactive.      Objective:     Vital Signs (Most Recent):  Temp: 99.1 °F (37.3 °C) (09/19/23 1121)  Pulse: 77 (09/19/23 1121)  Resp: 18 (09/19/23 1121)  BP: (!) 107/57 (09/19/23 1121)  SpO2: (!) 94 % (09/19/23 1121) Vital Signs (24h Range):  Temp:  [96.6 °F (35.9 °C)-99.1 °F (37.3 °C)] 99.1 °F (37.3 °C)  Pulse:  [] 77  Resp:  [18] 18  SpO2:  [92 %-96 %] 94 %  BP: (103-116)/(55-71) 107/57     Weight: 90.7 kg (200 lb)  Body mass index is 28.7 kg/m².  No intake or output data in the 24 hours ending 09/19/23 1317      Physical Exam  Vitals and nursing note reviewed.   Constitutional:       General: He is not in acute distress.     Appearance: He is well-developed. He is obese. He is not diaphoretic.   HENT:      Head: Normocephalic and atraumatic.      Nose: Nose normal.      Mouth/Throat:      Pharynx: No oropharyngeal exudate.   Eyes:      General: No scleral icterus.     Conjunctiva/sclera: Conjunctivae normal.      Pupils: Pupils are equal, round, and reactive to light.   Neck:      Thyroid: No thyromegaly.      Vascular: No JVD.      Trachea: No tracheal deviation.   Cardiovascular:      Rate and Rhythm: Normal rate and regular rhythm.      Heart sounds: Normal heart sounds. No murmur heard.  Pulmonary:      Effort: Pulmonary effort is normal. No respiratory distress.      Breath sounds: No wheezing, rhonchi or rales.   Chest:      Chest wall: No tenderness.   Abdominal:      General: Bowel sounds are normal. There is no distension.      Palpations: Abdomen is soft. There is no mass.      Tenderness: There is no abdominal tenderness. There is no guarding or rebound.   Musculoskeletal:         General: No tenderness. Normal range of motion.      Cervical back: Normal range of motion and neck supple.    Lymphadenopathy:      Cervical: No cervical adenopathy.   Skin:     General: Skin is warm and dry.      Findings: No erythema or rash.   Neurological:      Mental Status: He is alert and oriented to person, place, and time.      Cranial Nerves: No cranial nerve deficit.      Motor: No abnormal muscle tone.      Coordination: Coordination normal.      Deep Tendon Reflexes: Reflexes are normal and symmetric. Reflexes normal.   Psychiatric:         Thought Content: Thought content normal.         Judgment: Judgment normal.             Significant Labs: All pertinent labs within the past 24 hours have been reviewed.    Significant Imaging: I have reviewed all pertinent imaging results/findings within the past 24 hours.

## 2023-09-19 NOTE — PROGRESS NOTES
[facility] ([address] Medicine  Progress Note    Patient Name: Paul Nagy  MRN: [id number]  Patient Class: IP- Inpatient   Admission Date: 9/17/2023  Length of Stay: 2 days  Attending Physician: Teto Kelley DO  Primary Care Provider: Jacqui, Primary Doctor        Subjective:     Principal Problem:Severe sepsis        HPI:  Paul Nagy is a 52 year old male with PMH of asthma, urethral stricture with intermittent self cath who presented to ED for evaluation of SOB and chills. Patient states he has been having dysuria since self catheterization on Saturday and he usually need to self cath once every other week. He reports felling SOB earlier today associated with subjective fever, chills and myalgia while working on his computer which prompted him to call EMS. He denies headache, cough, wheezing, chest pain, palpitation, N/V/D/abdominal pain, hematuria, urgency, focal weakness/numbness, dark stool or bleeding from other sites. [facility]     ED course: febrile 101.3, tachycardia HR 130s and borderline hypotensive BP 90/70. Blood and urine cultures sent. Patient received IVF 30 ml/kg and empiric dose of vancomycin and zosyn per sepsis protocol. WBC 5, Cr 1.7, K 3.2, Mg 1.3, bilirubin 1.6, , . Elevated lactic acid 3.6 which improved to 2.4 on repeat. Troponin negative, COVID negative. CTA chest negative for PE but showed small airway disease, esophageal wall thickening of GERD vs reflux esophagitis . UA 3+ occult blood, 80 RBC, 2+ leukocytes and 17 WBC.  Patient received oral K and IV Mag SO4 replacement. CT abdomen w/o showed no acute process.     During my interview, patient is awake, conversant. He is not in distress and reports feeling better with sat 96% on 2 liter nasal canula.           Overview/Hospital Course:  Patient admitted to  for management of sepsis associated with likely UTI. Started on zosyn. Bcx 1  bottle with coag negative staph. Vanc discontinued. GNR in urine culture with pending speciation. Symptomatically improved and plan for DC once cultures result for prolonged PO course of abx. Elevated WBC likely reactive to improving infection as L shift decreased. Thrombocytopenia noted as well. Smear pending. Resolving transaminitis and MONE as well with resolved shock.       Interval History: see hospital course. Improved symptoms. Elevated WBC and thrombocytopenia. Bcx likely contaminant and will follow up. Awaiting Ucx for PO abx and duration.     Review of Systems   Constitutional:  Positive for fatigue. Negative for activity change, appetite change, chills, diaphoresis, fever and unexpected weight change.   HENT:  Negative for congestion, dental problem, drooling, ear discharge, ear pain, facial swelling, hearing loss, mouth sores, nosebleeds, postnasal drip, rhinorrhea, sinus pressure, sneezing, sore throat, tinnitus, trouble swallowing and voice change.    Eyes:  Negative for photophobia, pain, discharge, redness, itching and visual disturbance.   Respiratory:  Negative for apnea, cough, choking, chest tightness, shortness of breath, wheezing and stridor.    Cardiovascular:  Negative for chest pain, palpitations and leg swelling.   Gastrointestinal:  Negative for abdominal distention, abdominal pain, anal bleeding, blood in stool, constipation, diarrhea, nausea, rectal pain and vomiting.   Endocrine: Negative for cold intolerance, heat intolerance, polydipsia, polyphagia and polyuria.   Genitourinary:  Negative for decreased urine volume, difficulty urinating, dysuria, enuresis, flank pain, frequency, genital sores, hematuria, penile discharge, penile pain, penile swelling, scrotal swelling, testicular pain and urgency.   Musculoskeletal:  Positive for myalgias. Negative for arthralgias, back pain, gait problem, joint swelling, neck pain and neck stiffness.   Skin:  Negative for color change, pallor, rash  and wound.   Allergic/Immunologic: Negative for environmental allergies, food allergies and immunocompromised state.   Neurological:  Negative for dizziness, tremors, seizures, syncope, facial asymmetry, speech difficulty, weakness, light-headedness, numbness and headaches.   Hematological:  Negative for adenopathy. Does not bruise/bleed easily.   Psychiatric/Behavioral:  Negative for agitation, behavioral problems, confusion, decreased concentration, dysphoric mood, hallucinations, self-injury, sleep disturbance and suicidal ideas. The patient is not nervous/anxious and is not hyperactive.      Objective:     Vital Signs (Most Recent):  Temp: 99.1 °F (37.3 °C) (09/19/23 1121)  Pulse: 77 (09/19/23 1121)  Resp: 18 (09/19/23 1121)  BP: (!) 107/57 (09/19/23 1121)  SpO2: (!) 94 % (09/19/23 1121) Vital Signs (24h Range):  Temp:  [96.6 °F (35.9 °C)-99.1 °F (37.3 °C)] 99.1 °F (37.3 °C)  Pulse:  [] 77  Resp:  [18] 18  SpO2:  [92 %-96 %] 94 %  BP: (103-116)/(55-71) 107/57     Weight: 90.7 kg (200 lb)  Body mass index is 28.7 kg/m².  No intake or output data in the 24 hours ending 09/19/23 1317      Physical Exam  Vitals and nursing note reviewed.   Constitutional:       General: He is not in acute distress.     Appearance: He is well-developed. He is obese. He is not diaphoretic.   HENT:      Head: Normocephalic and atraumatic.      Nose: Nose normal.      Mouth/Throat:      Pharynx: No oropharyngeal exudate.   Eyes:      General: No scleral icterus.     Conjunctiva/sclera: Conjunctivae normal.      Pupils: Pupils are equal, round, and reactive to light.   Neck:      Thyroid: No thyromegaly.      Vascular: No JVD.      Trachea: No tracheal deviation.   Cardiovascular:      Rate and Rhythm: Normal rate and regular rhythm.      Heart sounds: Normal heart sounds. No murmur heard.  Pulmonary:      Effort: Pulmonary effort is normal. No respiratory distress.      Breath sounds: No wheezing, rhonchi or rales.   Chest:       Chest wall: No tenderness.   Abdominal:      General: Bowel sounds are normal. There is no distension.      Palpations: Abdomen is soft. There is no mass.      Tenderness: There is no abdominal tenderness. There is no guarding or rebound.   Musculoskeletal:         General: No tenderness. Normal range of motion.      Cervical back: Normal range of motion and neck supple.   Lymphadenopathy:      Cervical: No cervical adenopathy.   Skin:     General: Skin is warm and dry.      Findings: No erythema or rash.   Neurological:      Mental Status: He is alert and oriented to person, place, and time.      Cranial Nerves: No cranial nerve deficit.      Motor: No abnormal muscle tone.      Coordination: Coordination normal.      Deep Tendon Reflexes: Reflexes are normal and symmetric. Reflexes normal.   Psychiatric:         Thought Content: Thought content normal.         Judgment: Judgment normal.             Significant Labs: All pertinent labs within the past 24 hours have been reviewed.    Significant Imaging: I have reviewed all pertinent imaging results/findings within the past 24 hours.      Assessment/Plan:      * Severe sepsis  This patient does have evidence of infective focus  My overall impression is severe sepsis .  Source: Respiratory and Urinary Tract with developing Pneumonia and UTI   Antibiotics given-   Antibiotics (72h ago, onward)    Start     Stop Route Frequency Ordered    09/18/23 0500  piperacillin-tazobactam (ZOSYN) 4.5 g in dextrose 5 % in water (D5W) 100 mL IVPB (MB+)         -- IV Every 8 hours (non-standard times) 09/18/23 0336        Latest lactate reviewed-  Recent Labs   Lab 09/18/23  0416   LACTATE 1.7     Organ dysfunction indicated by Acute kidney injury    Fluid challenge Actual Body weight- Patient will receive 30ml/kg actual body weight to calculate fluid bolus for treatment of septic shock.     Post- resuscitation assessment Yes Perfusion exam was performed within 6 hours of septic  shock presentation after bolus shows Adequate tissue perfusion assessed by non-invasive monitoring       Will Not start Pressors-   Source control achieved by:    - blood cultures were contaminant DC vanc  -continue zosyn for  coverage. GNR with pending spp  -lactic acid resolved  -CT abdomen w/o acute process   -follow up with blood and urine cultures     Hypomagnesemia  Patient has Abnormal Magnesium: hypomagnesemia. Will continue to monitor electrolytes closely. Will replace the affected electrolytes and repeat labs to be done after interventions completed. The patient's magnesium results have been reviewed and are listed below.  Recent Labs   Lab 09/19/23  0240   MG 1.9        Hypokalemia  -replaced with oral K   -follow up with repeat        H/O urethral stricture  -patient follows with urology at [facility]   -requires intermittent self catheterization at home once every other week per patient   -CT abdomen w/o hydronephrosis   -continue zosyn for suspected catheter induced UTI and follow up with cultures   - growing GNR pending speciation        Acute hypoxemic respiratory failure  Patient with Hypoxic Respiratory failure which is Acute.  he is not on home oxygen. Supplemental oxygen was provided and noted-      .   Signs/symptoms of respiratory failure include- tachypnea and increased work of breathing. Contributing diagnoses includes - Pneumonia Labs and images were reviewed. Patient Has not had a recent ABG. Will treat underlying causes and adjust management of respiratory failure as follows-   -continue empiric zosyn and follow up with blood cultures   -continue supplemental oxygen with goal sat ~94%    Resolved    Gastroesophageal reflux disease with esophagitis without hemorrhage  -seen on CTA chest   -will start on protonix daily       Mild intermittent asthma  -takes advair daily and albuterol prn at home   -no signs of acute exacerbation   -continue breo daily and duonebs prn       MONE (acute  kidney injury)  Patient with acute kidney injury/acute renal failure likely due to acute tubular necrosis caused by severe sepsis and transient hypotension  MONE is currently stable. Baseline creatinine unknown - Labs reviewed- Renal function/electrolytes with Estimated Creatinine Clearance: 64.5 mL/min (A) (based on SCr of 1.5 mg/dL (H)). according to latest data. Monitor urine output and serial BMP and adjust therapy as needed. Avoid nephrotoxins and renally dose meds for GFR listed above.    Unclear [facility] but likely is now back at baseline with resolved hypotension      VTE Risk Mitigation (From admission, onward)         Ordered     enoxaparin injection 40 mg  Every 24 hours         09/18/23 0423     IP VTE LOW RISK PATIENT  Once         09/18/23 0219     Place sequential compression device  Until discontinued         09/18/23 0219                Discharge Planning   ROSA: 9/20/2023     Code Status: Full Code   Is the patient medically ready for discharge?: No    Reason for patient still in hospital (select all that apply): Laboratory test and Treatment  Discharge Plan A: Home                  Coretta Kelley DO  Department of Hospital Medicine   [facility] - Intensive Care ([place]-[facility])

## 2023-09-19 NOTE — PROGRESS NOTES
Therapy with vancomycin complete and/or consult discontinued by provider.  Pharmacy will sign off, please re-consult as needed.     Desmond Ross, PharmD  Ext: [phone]

## 2023-09-19 NOTE — ASSESSMENT & PLAN NOTE
Patient has Abnormal Magnesium: hypomagnesemia. Will continue to monitor electrolytes closely. Will replace the affected electrolytes and repeat labs to be done after interventions completed. The patient's magnesium results have been reviewed and are listed below.  Recent Labs   Lab 09/19/23  0240   MG 1.9

## 2023-09-19 NOTE — ASSESSMENT & PLAN NOTE
This patient does have evidence of infective focus  My overall impression is severe sepsis .  Source: Respiratory and Urinary Tract with developing Pneumonia and UTI   Antibiotics given-   Antibiotics (72h ago, onward)    Start     Stop Route Frequency Ordered    09/18/23 0500  piperacillin-tazobactam (ZOSYN) 4.5 g in dextrose 5 % in water (D5W) 100 mL IVPB (MB+)         -- IV Every 8 hours (non-standard times) 09/18/23 0336        Latest lactate reviewed-  Recent Labs   Lab 09/18/23  0416   LACTATE 1.7     Organ dysfunction indicated by Acute kidney injury    Fluid challenge Actual Body weight- Patient will receive 30ml/kg actual body weight to calculate fluid bolus for treatment of septic shock.     Post- resuscitation assessment Yes Perfusion exam was performed within 6 hours of septic shock presentation after bolus shows Adequate tissue perfusion assessed by non-invasive monitoring       Will Not start Pressors-   Source control achieved by:    - blood cultures were contaminant DC vanc  -continue zosyn for  coverage. GNR with pending spp  -lactic acid resolved  -CT abdomen w/o acute process   -follow up with blood and urine cultures

## 2023-09-19 NOTE — ASSESSMENT & PLAN NOTE
Patient with Hypoxic Respiratory failure which is Acute.  he is not on home oxygen. Supplemental oxygen was provided and noted-      .   Signs/symptoms of respiratory failure include- tachypnea and increased work of breathing. Contributing diagnoses includes - Pneumonia Labs and images were reviewed. Patient Has not had a recent ABG. Will treat underlying causes and adjust management of respiratory failure as follows-   -continue empiric zosyn and follow up with blood cultures   -continue supplemental oxygen with goal sat ~94%    Resolved

## 2023-09-20 VITALS
BODY MASS INDEX: 28.63 KG/M2 | TEMPERATURE: 98 F | SYSTOLIC BLOOD PRESSURE: 105 MMHG | HEART RATE: 82 BPM | DIASTOLIC BLOOD PRESSURE: 65 MMHG | HEIGHT: 70 IN | RESPIRATION RATE: 18 BRPM | WEIGHT: 200 LBS | OXYGEN SATURATION: 92 %

## 2023-09-20 LAB
ALBUMIN SERPL BCP-MCNC: 2.8 G/DL (ref 3.5–5.2)
ALP SERPL-CCNC: 113 U/L (ref 55–135)
ALT SERPL W/O P-5'-P-CCNC: 84 U/L (ref 10–44)
ANION GAP SERPL CALC-SCNC: 8 MMOL/L (ref 8–16)
AST SERPL-CCNC: 28 U/L (ref 10–40)
BACTERIA BLD CULT: ABNORMAL
BACTERIA UR CULT: ABNORMAL
BASOPHILS # BLD AUTO: 0.08 K/UL (ref 0–0.2)
BASOPHILS NFR BLD: 0.5 % (ref 0–1.9)
BILIRUB SERPL-MCNC: 1.4 MG/DL (ref 0.1–1)
BUN SERPL-MCNC: 11 MG/DL (ref 6–20)
CALCIUM SERPL-MCNC: 8.9 MG/DL (ref 8.7–10.5)
CHLORIDE SERPL-SCNC: 108 MMOL/L (ref 95–110)
CO2 SERPL-SCNC: 23 MMOL/L (ref 23–29)
CREAT SERPL-MCNC: 1.2 MG/DL (ref 0.5–1.4)
DIFFERENTIAL METHOD: ABNORMAL
EOSINOPHIL # BLD AUTO: 0.4 K/UL (ref 0–0.5)
EOSINOPHIL NFR BLD: 2.3 % (ref 0–8)
ERYTHROCYTE [DISTWIDTH] IN BLOOD BY AUTOMATED COUNT: 12.9 % (ref 11.5–14.5)
EST. GFR  (NO RACE VARIABLE): >60 ML/MIN/1.73 M^2
GLUCOSE SERPL-MCNC: 90 MG/DL (ref 70–110)
HCT VFR BLD AUTO: 40.9 % (ref 40–54)
HGB BLD-MCNC: 13.6 G/DL (ref 14–18)
IMM GRANULOCYTES # BLD AUTO: 0.19 K/UL (ref 0–0.04)
IMM GRANULOCYTES NFR BLD AUTO: 1.3 % (ref 0–0.5)
LYMPHOCYTES # BLD AUTO: 1.5 K/UL (ref 1–4.8)
LYMPHOCYTES NFR BLD: 10.2 % (ref 18–48)
MAGNESIUM SERPL-MCNC: 1.9 MG/DL (ref 1.6–2.6)
MCH RBC QN AUTO: 31.6 PG (ref 27–31)
MCHC RBC AUTO-ENTMCNC: 33.3 G/DL (ref 32–36)
MCV RBC AUTO: 95 FL (ref 82–98)
MONOCYTES # BLD AUTO: 1.1 K/UL (ref 0.3–1)
MONOCYTES NFR BLD: 7.3 % (ref 4–15)
NEUTROPHILS # BLD AUTO: 11.8 K/UL (ref 1.8–7.7)
NEUTROPHILS NFR BLD: 78.4 % (ref 38–73)
NRBC BLD-RTO: 0 /100 WBC
PATH REV BLD -IMP: NORMAL
PHOSPHATE SERPL-MCNC: 3.3 MG/DL (ref 2.7–4.5)
PLATELET # BLD AUTO: 117 K/UL (ref 150–450)
PMV BLD AUTO: 11.3 FL (ref 9.2–12.9)
POTASSIUM SERPL-SCNC: 3.7 MMOL/L (ref 3.5–5.1)
PROT SERPL-MCNC: 6 G/DL (ref 6–8.4)
RBC # BLD AUTO: 4.31 M/UL (ref 4.6–6.2)
SODIUM SERPL-SCNC: 139 MMOL/L (ref 136–145)
WBC # BLD AUTO: 15.08 K/UL (ref 3.9–12.7)

## 2023-09-20 PROCEDURE — 36415 COLL VENOUS BLD VENIPUNCTURE: CPT | Performed by: HOSPITALIST

## 2023-09-20 PROCEDURE — 94761 N-INVAS EAR/PLS OXIMETRY MLT: CPT

## 2023-09-20 PROCEDURE — 84100 ASSAY OF PHOSPHORUS: CPT | Performed by: HOSPITALIST

## 2023-09-20 PROCEDURE — 63600175 PHARM REV CODE 636 W HCPCS: Performed by: HOSPITALIST

## 2023-09-20 PROCEDURE — 80053 COMPREHEN METABOLIC PANEL: CPT | Performed by: HOSPITALIST

## 2023-09-20 PROCEDURE — 25000003 PHARM REV CODE 250: Performed by: HOSPITALIST

## 2023-09-20 PROCEDURE — 99900035 HC TECH TIME PER 15 MIN (STAT)

## 2023-09-20 PROCEDURE — 85025 COMPLETE CBC W/AUTO DIFF WBC: CPT | Performed by: HOSPITALIST

## 2023-09-20 PROCEDURE — 25000003 PHARM REV CODE 250: Performed by: STUDENT IN AN ORGANIZED HEALTH CARE EDUCATION/TRAINING PROGRAM

## 2023-09-20 PROCEDURE — 83735 ASSAY OF MAGNESIUM: CPT | Performed by: HOSPITALIST

## 2023-09-20 RX ORDER — SULFAMETHOXAZOLE AND TRIMETHOPRIM 800; 160 MG/1; MG/1
1 TABLET ORAL 2 TIMES DAILY
Qty: 22 TABLET | Refills: 0 | Status: SHIPPED | OUTPATIENT
Start: 2023-09-20 | End: 2023-10-01

## 2023-09-20 RX ORDER — SULFAMETHOXAZOLE AND TRIMETHOPRIM 800; 160 MG/1; MG/1
1 TABLET ORAL 2 TIMES DAILY
Status: DISCONTINUED | OUTPATIENT
Start: 2023-09-20 | End: 2023-09-20 | Stop reason: HOSPADM

## 2023-09-20 RX ADMIN — CHOLECALCIFEROL TAB 25 MCG (1000 UNIT) 1000 UNITS: 25 TAB at 10:09

## 2023-09-20 RX ADMIN — PIPERACILLIN SODIUM AND TAZOBACTAM SODIUM 4.5 G: 4; .5 INJECTION, POWDER, FOR SOLUTION INTRAVENOUS at 03:09

## 2023-09-20 RX ADMIN — ASPIRIN 81 MG: 81 TABLET, COATED ORAL at 10:09

## 2023-09-20 RX ADMIN — PANTOPRAZOLE SODIUM 20 MG: 20 TABLET, DELAYED RELEASE ORAL at 10:09

## 2023-09-20 RX ADMIN — SULFAMETHOXAZOLE AND TRIMETHOPRIM 1 TABLET: 800; 160 TABLET ORAL at 10:09

## 2023-09-20 NOTE — NURSING
Pt refuses telemetry box and continuous pulse ox. Pt says he was told after his Echo that the results were okay and that he no longer needs to wear the cardiac monitor. Pt says that his o2 level is checked intermittently with the vital signs anyway, and says that he doesn't need it too. MD informed via Secured Chat.

## 2023-09-20 NOTE — ASSESSMENT & PLAN NOTE
This patient does have evidence of infective focus  My overall impression is severe sepsis .  Source: Respiratory and Urinary Tract with developing Pneumonia and UTI   Antibiotics given-   Antibiotics (72h ago, onward)    None        Latest lactate reviewed-  Recent Labs   Lab 09/18/23  0416   LACTATE 1.7     Organ dysfunction indicated by Acute kidney injury    Fluid challenge Actual Body weight- Patient will receive 30ml/kg actual body weight to calculate fluid bolus for treatment of septic shock.     Post- resuscitation assessment Yes Perfusion exam was performed within 6 hours of septic shock presentation after bolus shows Adequate tissue perfusion assessed by non-invasive monitoring       Will Not start Pressors-   Source control achieved by:    - blood cultures were contaminant DC vanc  -continue zosyn for  coverage. Ucx demonstrated citrobacter-finish with bactrim on 10/2  -lactic acid resolved  -CT abdomen w/o acute process   -follow up with blood and urine cultures

## 2023-09-20 NOTE — DISCHARGE INSTRUCTIONS
You were admitted for a urinary tract infection and associated sepsis. This caused injury to your kidneys, liver and blood cells which are all improving or completely resolved. The medication you will be taking is called bactrim which you will complete on 10/2/23. Attached are some instructions on the medication and some side effects to be expected. You will also start taking protonix which is a heartburn medication. It is important for you to schedule your appointment with urology so that you can follow up on the self catheterization as this likely caused your urinary tract infection. You also will need follow up with your primary care doctor as well.     Some precautions to be aware of on discharge are if you experience a reoccurrence of fever, chills, chest pain, shortness of breath, decreased or absent urine output or any new pain or burning noted.

## 2023-09-20 NOTE — ASSESSMENT & PLAN NOTE
-patient follows with urology at [facility]   -requires intermittent self catheterization at home once every other week per patient   -CT abdomen w/o hydronephrosis   -continue zosyn for suspected catheter induced UTI and follow up with cultures   - citrobacter sensitive to bactrim

## 2023-09-20 NOTE — NURSING
Pt is AAOX4 , reports no concern at present. Pt educated on discharge medications and follow up appointment, and received medications at bedside. Patient verbalized understanding. Patient is transported via a wheelchair.

## 2023-09-20 NOTE — PLAN OF CARE
[facility] will [facility] once appointment is available.      [facility]  Case Management  [phone]

## 2023-09-20 NOTE — ASSESSMENT & PLAN NOTE
Patient has Abnormal Magnesium: hypomagnesemia. Will continue to monitor electrolytes closely. Will replace the affected electrolytes and repeat labs to be done after interventions completed. The patient's magnesium results have been reviewed and are listed below.  Recent Labs   Lab 09/20/23  0548   MG 1.9

## 2023-09-20 NOTE — PLAN OF CARE
CM spoke with pt in room - he is ready to go and states his dad will pick him up.    POLLY HoangN, BS, RN, CCM

## 2023-09-20 NOTE — ASSESSMENT & PLAN NOTE
Patient with acute kidney injury/acute renal failure likely due to acute tubular necrosis caused by severe sepsis and transient hypotension  MONE is currently stable. Baseline creatinine unknown - Labs reviewed- Renal function/electrolytes with Estimated Creatinine Clearance: 80.7 mL/min (based on SCr of 1.2 mg/dL). according to latest data. Monitor urine output and serial BMP and adjust therapy as needed. Avoid nephrotoxins and renally dose meds for GFR listed above.    Unclear baseline as [facility] but likely is now back at baseline with resolved hypotension

## 2023-09-20 NOTE — DISCHARGE SUMMARY
[facility] - Intensive Care ([address] Medicine  Discharge Summary      Patient Name: Paul Nagy  MRN: [id number]  SHAAN: [id number]  Patient Class: IP- Inpatient  Admission Date: 9/17/2023  Hospital Length of Stay: 3 days  Discharge Date and Time:  09/20/2023 6:14 PM  Attending Physician: Jacqui att. providers found   Discharging Provider: Coretta Kelley DO  Primary Care Provider: Amy, [facility] Medicine Team: [facility] W Coretta Kelley DO  Primary Care Team: [facility] [place] [facility] W    HPI:   Paul Nagy is a 52 year old male with PMH of asthma, urethral stricture with intermittent self cath who presented to ED for evaluation of SOB and chills. Patient states he has been having dysuria since self catheterization on Saturday and he usually need to self cath once every other week. He reports felling SOB earlier today associated with subjective fever, chills and myalgia while working on his computer which prompted him to call EMS. He denies headache, cough, wheezing, chest pain, palpitation, N/V/D/abdominal pain, hematuria, urgency, focal weakness/numbness, dark stool or bleeding from other sites. [facility]     ED course: febrile 101.3, tachycardia HR 130s and borderline hypotensive BP 90/70. Blood and urine cultures sent. Patient received IVF 30 ml/kg and empiric dose of vancomycin and zosyn per sepsis protocol. WBC 5, Cr 1.7, K 3.2, Mg 1.3, bilirubin 1.6, , . Elevated lactic acid 3.6 which improved to 2.4 on repeat. Troponin negative, COVID negative. CTA chest negative for PE but showed small airway disease, esophageal wall thickening of GERD vs reflux esophagitis . UA 3+ occult blood, 80 RBC, 2+ leukocytes and 17 WBC.  Patient received oral K and IV Mag SO4 replacement. CT abdomen w/o showed no acute process.     During my interview, patient is awake, conversant. He is not in distress and reports feeling  better with sat 96% on 2 liter nasal canula.           * No surgery found *      Hospital Course:   Patient admitted to  for management of sepsis associated with likely UTI. Started on zosyn. Bcx 1 bottle with coag negative staph. Vanc discontinued. GNR in urine culture with pending speciation. Symptomatically improved and plan for DC once cultures result for prolonged PO course of abx. Elevated WBC likely reactive to improving infection as L shift decreased. Thrombocytopenia noted as well. WBC improving and thrombocytopenia improving as well. Likely result of sepsis. Smear demonstrate reactive process. Resolving transaminitis and MONE as well with resolved shock.   Urine Cx grew citrobacter sensitive to bactrim and plan to complete 14 day course ending on 10/2. Patient instructed to obtain urology follow up as well as PCP.       Goals of Care Treatment Preferences:  Code Status: Full Code      Consults:     Pulmonary  Acute hypoxemic respiratory failure  Patient with Hypoxic Respiratory failure which is Acute.  he is not on home oxygen. Supplemental oxygen was provided and noted-      .   Signs/symptoms of respiratory failure include- tachypnea and increased work of breathing. Contributing diagnoses includes - Pneumonia Labs and images were reviewed. Patient Has not had a recent ABG. Will treat underlying causes and adjust management of respiratory failure as follows-   -continue empiric zosyn and follow up with blood cultures   -continue supplemental oxygen with goal sat ~94%    Resolved    Mild intermittent asthma  -takes advair daily and albuterol prn at home   -no signs of acute exacerbation   -continue breo daily and duonebs prn       Renal/  Hypomagnesemia  Patient has Abnormal Magnesium: hypomagnesemia. Will continue to monitor electrolytes closely. Will replace the affected electrolytes and repeat labs to be done after interventions completed. The patient's magnesium results have been reviewed and are  listed below.  Recent Labs   Lab 09/20/23  0548   MG 1.9        Hypokalemia  -replaced with oral K   -follow up with repeat        H/O urethral stricture  -[facility]   -requires intermittent self catheterization at home once every other week per patient   -CT abdomen w/o hydronephrosis   -continue zosyn for suspected catheter induced UTI and follow up with cultures   - citrobacter sensitive to bactrim        MONE (acute kidney injury)  Patient with acute kidney injury/acute renal failure likely due to acute tubular necrosis caused by severe sepsis and transient hypotension  MONE is currently stable. Baseline creatinine unknown - Labs reviewed- Renal function/electrolytes with Estimated Creatinine Clearance: 80.7 mL/min (based on SCr of 1.2 mg/dL). according to latest data. Monitor urine output and serial BMP and adjust therapy as needed. Avoid nephrotoxins and renally dose meds for GFR listed above.    Unclear baseline as patient gets care [facility] but likely is now back at baseline with resolved hypotension    ID  * Severe sepsis  This patient does have evidence of infective focus  My overall impression is severe sepsis .  Source: Respiratory and Urinary Tract with developing Pneumonia and UTI   Antibiotics given-   Antibiotics (72h ago, onward)    None        Latest lactate reviewed-  Recent Labs   Lab 09/18/23  0416   LACTATE 1.7     Organ dysfunction indicated by Acute kidney injury    Fluid challenge Actual Body weight- Patient will receive 30ml/kg actual body weight to calculate fluid bolus for treatment of septic shock.     Post- resuscitation assessment Yes Perfusion exam was performed within 6 hours of septic shock presentation after bolus shows Adequate tissue perfusion assessed by non-invasive monitoring       Will Not start Pressors-   Source control achieved by:    - blood cultures were contaminant DC vanc  -continue zosyn for  coverage. Ucx demonstrated citrobacter-finish  with bactrim on 10/2  -lactic acid resolved  -CT abdomen w/o acute process   -follow up with blood and urine cultures     GI  Gastroesophageal reflux disease with esophagitis without hemorrhage  -seen on CTA chest   -will start on protonix daily         Final Active Diagnoses:    Diagnosis Date Noted POA    PRINCIPAL PROBLEM:  Severe sepsis [A41.9, R65.20] 09/18/2023 Yes    MONE (acute kidney injury) [N17.9] 09/18/2023 Yes    Mild intermittent asthma [J45.20] 09/18/2023 Yes    Gastroesophageal reflux disease with esophagitis without hemorrhage [K21.00] 09/18/2023 Yes    Acute hypoxemic respiratory failure [J96.01] 09/18/2023 Yes    H/O urethral stricture [Z87.448] 09/18/2023 Yes    Hypokalemia [E87.6] 09/18/2023 Yes    Hypomagnesemia [E83.42] 09/18/2023 Yes      Problems Resolved During this Admission:       Discharged Condition: good    Disposition: Home or Self Care    Follow Up:   Follow-up Information     No, Primary Doctor Follow up.           No, Primary Doctor Follow up.           No, Primary Doctor Follow up.           No, Primary Doctor Follow up.           No, Primary Doctor Follow up.           No, Primary Doctor .           Gi, [facility] - Follow up.    Why: PCP will send the patient a message when appointment is available.  Contact information:  [address] [facility] [address] [phone]                       Patient Instructions:   No discharge procedures on file.    Significant Diagnostic Studies: Labs:   CMP   Recent Labs   Lab 09/19/23  0240 09/20/23  0548    139   K 4.2 3.7   * 108   CO2 20* 23   GLU 98 90   BUN 14 11   CREATININE 1.5* 1.2   CALCIUM 8.6* 8.9   PROT 5.6* 6.0   ALBUMIN 2.8* 2.8*   BILITOT 1.5* 1.4*   ALKPHOS 103 113   AST 82* 28   * 84*   ANIONGAP 7* 8   , CBC   Recent Labs   Lab 09/19/23  0240 09/19/23  0834 09/20/23  0548   WBC 21.59* 21.48* 15.08*   HGB 13.8* 14.4 13.6*   HCT 39.0* 42.6 40.9   PLT 99* 112* 117*    and All labs  within the past 24 hours have been reviewed  Microbiology:   Blood Culture   Lab Results   Component Value Date    LABBLOO No Growth to date 09/19/2023    LABBLOO No Growth to date 09/19/2023    and Urine Culture    Lab Results   Component Value Date    LABURIN (A) 09/17/2023     CITROBACTER FREUNDII  10,000 - 49,999 cfu/ml  No other significant isolate         Pending Diagnostic Studies:     None         Medications:  Reconciled Home Medications:      Medication List      START taking these medications    pantoprazole 20 MG tablet  Commonly known as: PROTONIX  Take 1 tablet (20 mg total) by mouth once daily.     sulfamethoxazole-trimethoprim 800-160mg 800-160 mg Tab  Commonly known as: BACTRIM DS  Take 1 tablet by mouth 2 (two) times daily. for 11 days        CONTINUE taking these medications    albuterol 90 mcg/actuation inhaler  Commonly known as: PROVENTIL/VENTOLIN HFA  INHALE 2 PUFFS BY MOUTH FOUR TIMES A DAY AS NEEDED FOR BREATHING     albuterol-ipratropium 2.5 mg-0.5 mg/3 mL nebulizer solution  Commonly known as: DUO-NEB  INHALE 3ML BY NEBULIZER FOUR TIMES A DAY AS NEEDED TO OPEN AIRWAYS     aspirin 81 MG EC tablet  Commonly known as: ECOTRIN  Take 81 mg by mouth once daily.     vitamin D 1000 units Tab  Commonly known as: VITAMIN D3  Take 1,000 Units by mouth once daily.     WIXELA INHUB 500-50 mcg/dose Dsdv diskus inhaler  Generic drug: fluticasone-salmeterol 500-50 mcg/dose  Inhale 1 puff into the lungs 2 (two) times daily. Controller            Indwelling Lines/Drains at time of discharge:   Lines/Drains/Airways     None                 Time spent on the discharge of patient: 45 minutes         Coretta Kelley DO  Department of Hospital Medicine  [facility] - Intensive Care ([place]-16)

## 2023-09-20 NOTE — PLAN OF CARE
[facility] - Intensive Care ([facility]-16)  Discharge Final Note    Primary Care Provider: Gi, [facility] -    Expected Discharge Date: 9/20/2023    Final Discharge Note (most recent)       Final Note - 09/20/23 1617          Final Note    Assessment Type Final Discharge Note (P)      Anticipated Discharge Disposition Home or Self Care (P)         Post-Acute Status    Discharge Delays None known at this time (P)                      Important Message from Medicare             Contact Info       No, Primary Doctor        Next Steps: Follow up    No, Primary Doctor        Next Steps: Follow up    No, Primary Doctor        Next Steps: Follow up    No, Primary Doctor        Next Steps: Follow up    No, Primary Doctor        Next Steps: Follow up    No, Primary Doctor        Next Steps: Follow up    [facility] -    Relationship: PCP - General    [address] [place] [id number]   Phone: [phone]       Next Steps: Follow up    Instructions: PCP will send the patient a message when appointment is available.        Pt d/c'd to home.    POLLY HoangN, BS, RN, CCM

## 2023-09-20 NOTE — PLAN OF CARE
Problem: Adult Inpatient Plan of Care  Goal: Plan of Care Review  Outcome: Ongoing, Not Progressing  Goal: Optimal Comfort and Wellbeing  Outcome: Ongoing, Not Progressing     Problem: Infection  Goal: Absence of Infection Signs and Symptoms  Outcome: Ongoing, Not Progressing     AAOx4. Continues with iv abx. Temp monitored for elevation. Denies discomfort at present time.

## 2023-09-22 LAB — BACTERIA BLD CULT: NORMAL

## 2023-09-24 LAB
BACTERIA BLD CULT: NORMAL
BACTERIA BLD CULT: NORMAL

## 2023-12-18 PROBLEM — N17.9 AKI (ACUTE KIDNEY INJURY): Status: RESOLVED | Noted: 2023-09-18 | Resolved: 2023-12-18

## 2023-12-18 PROBLEM — A41.9 SEVERE SEPSIS: Status: RESOLVED | Noted: 2023-09-18 | Resolved: 2023-12-18

## 2023-12-18 PROBLEM — R65.20 SEVERE SEPSIS: Status: RESOLVED | Noted: 2023-09-18 | Resolved: 2023-12-18

## 2023-12-18 PROBLEM — J96.01 ACUTE HYPOXEMIC RESPIRATORY FAILURE: Status: RESOLVED | Noted: 2023-09-18 | Resolved: 2023-12-18

## 2024-05-29 NOTE — ASSESSMENT & PLAN NOTE
-replaced with oral K   -follow up with repeat       Preparing to see the patient including review of tests and other providers' notes, confirming history with patient/family member, performing medical examination and evaluation, counseling and educating the patient/family/caregiver, ordering medications, tests and procedures, communicating with other health care professionals, documenting clinical information in the EMR, independently interpreting results and communicating results to the patient/family/caregiver, care coordination